# Patient Record
Sex: MALE | Race: WHITE | Employment: FULL TIME | URBAN - METROPOLITAN AREA
[De-identification: names, ages, dates, MRNs, and addresses within clinical notes are randomized per-mention and may not be internally consistent; named-entity substitution may affect disease eponyms.]

---

## 2019-07-16 ENCOUNTER — HOSPITAL ENCOUNTER (EMERGENCY)
Age: 59
Discharge: HOME | End: 2019-07-16
Payer: COMMERCIAL

## 2019-07-16 ENCOUNTER — APPOINTMENT (OUTPATIENT)
Dept: GENERAL RADIOLOGY | Age: 59
End: 2019-07-16
Payer: COMMERCIAL

## 2019-07-16 VITALS
BODY MASS INDEX: 23.62 KG/M2 | HEIGHT: 70 IN | SYSTOLIC BLOOD PRESSURE: 130 MMHG | OXYGEN SATURATION: 97 % | HEART RATE: 71 BPM | WEIGHT: 165 LBS | RESPIRATION RATE: 18 BRPM | TEMPERATURE: 98.4 F | DIASTOLIC BLOOD PRESSURE: 73 MMHG

## 2019-07-16 DIAGNOSIS — S82.191A OTHER CLOSED FRACTURE OF PROXIMAL END OF RIGHT TIBIA, INITIAL ENCOUNTER: Primary | ICD-10-CM

## 2019-07-16 PROCEDURE — 99283 EMERGENCY DEPT VISIT LOW MDM: CPT

## 2019-07-16 PROCEDURE — 6370000000 HC RX 637 (ALT 250 FOR IP): Performed by: NURSE PRACTITIONER

## 2019-07-16 PROCEDURE — 73564 X-RAY EXAM KNEE 4 OR MORE: CPT

## 2019-07-16 RX ORDER — GABAPENTIN 100 MG/1
100 CAPSULE ORAL 3 TIMES DAILY
COMMUNITY

## 2019-07-16 RX ORDER — HYDROCODONE BITARTRATE AND ACETAMINOPHEN 5; 325 MG/1; MG/1
1 TABLET ORAL ONCE
Status: COMPLETED | OUTPATIENT
Start: 2019-07-16 | End: 2019-07-16

## 2019-07-16 RX ORDER — METHADONE HYDROCHLORIDE 10 MG/1
10 TABLET ORAL EVERY 4 HOURS PRN
COMMUNITY

## 2019-07-16 RX ORDER — IBUPROFEN 800 MG/1
800 TABLET ORAL EVERY 8 HOURS PRN
Qty: 20 TABLET | Refills: 0 | Status: SHIPPED | OUTPATIENT
Start: 2019-07-16

## 2019-07-16 RX ORDER — IBUPROFEN 800 MG/1
800 TABLET ORAL ONCE
Status: COMPLETED | OUTPATIENT
Start: 2019-07-16 | End: 2019-07-16

## 2019-07-16 RX ADMIN — HYDROCODONE BITARTRATE AND ACETAMINOPHEN 1 TABLET: 5; 325 TABLET ORAL at 06:55

## 2019-07-16 RX ADMIN — IBUPROFEN 800 MG: 800 TABLET ORAL at 06:55

## 2019-07-16 ASSESSMENT — ENCOUNTER SYMPTOMS
VOMITING: 0
EYE PAIN: 0
ABDOMINAL PAIN: 0
SORE THROAT: 0
DIARRHEA: 0
NAUSEA: 0
RHINORRHEA: 0
BACK PAIN: 0
COUGH: 0
PHOTOPHOBIA: 0
SHORTNESS OF BREATH: 0

## 2019-07-16 ASSESSMENT — PAIN DESCRIPTION - ORIENTATION: ORIENTATION: RIGHT

## 2019-07-16 ASSESSMENT — PAIN DESCRIPTION - DESCRIPTORS: DESCRIPTORS: SHOOTING

## 2019-07-16 ASSESSMENT — PAIN SCALES - GENERAL
PAINLEVEL_OUTOF10: 8

## 2019-07-16 ASSESSMENT — PAIN DESCRIPTION - PAIN TYPE: TYPE: ACUTE PAIN

## 2019-07-16 ASSESSMENT — PAIN DESCRIPTION - FREQUENCY: FREQUENCY: CONTINUOUS

## 2019-07-16 ASSESSMENT — PAIN DESCRIPTION - LOCATION: LOCATION: KNEE

## 2019-07-16 NOTE — ED PROVIDER NOTES
130/73    Pulse: 70 71    Resp: 18 18    Temp:   98.4 °F (36.9 °C)   TempSrc:  Oral Oral   SpO2: 98% 97%    Weight:  165 lb (74.8 kg)    Height:  5' 10\" (1.778 m)             MDM on exam nontoxic and in no distress. There is minimal tenderness on exam, he reports tenderness but more to medial, superior and distal knee area. XR ordered for evaluation of acute pathology. xr demonstrates concerning area of potential fracture to lateral tibea, there is no tenderness to this area on exam. He does not have pain out of proportion. Compartments are soft. Discussed with Dr. Partha Calderon, trauma surgery. He will be splinted. He is advised to f/u with ortho. Extended discharge instructions provided to patient including signs, symptoms and red flags that they should return to the emergency department. They express good understanding. Standard anticipatory guidance given to patient upon discharge. Have given them a specific time frame in which to follow-up and who to follow-up with. I have also advised them that they should return to the emergency department if they get worse, or not getting better or develop any new or concerning symptoms. Patient demonstrates understanding and all questions were answered. OARRS reviewed. CRITICAL CARE TIME       CONSULTS:  None    PROCEDURES:  Unless otherwise noted below, none     Procedures    FINAL IMPRESSION      1.  Other closed fracture of proximal end of right tibia, initial encounter          DISPOSITION/PLAN   DISPOSITION Decision To Discharge 07/16/2019 06:57:35 AM      PATIENT REFERRED TO:  Emilie Barbosa MD  [address] [place] [address]  [phone]    Go in 1 day  For continued evaluation and management      DISCHARGE MEDICATIONS:  New Prescriptions    No medications on file          (Please notethat portions of this note were completed with a voice recognition program.  Efforts were made to edit the dictations but occasionally words are mis-transcribed.)    PREETHI Alonzo CNP (electronically signed)  Attending Emergency Physician          PREETHI Alonzo CNP  07/16/19 [id number] [facility], APRN - CNP  07/16/19 [id number]

## 2019-07-17 ENCOUNTER — HOSPITAL ENCOUNTER (OUTPATIENT)
Dept: CT IMAGING | Age: 59
Discharge: HOME | End: 2019-07-19
Payer: COMMERCIAL

## 2019-07-17 DIAGNOSIS — S82.141A CLOSED FRACTURE OF RIGHT TIBIAL PLATEAU, INITIAL ENCOUNTER: ICD-10-CM

## 2019-07-17 PROCEDURE — 73700 CT LOWER EXTREMITY W/O DYE: CPT

## 2019-07-17 PROCEDURE — 76376 3D RENDER W/INTRP POSTPROCES: CPT

## 2021-10-15 ENCOUNTER — HOSPITAL ENCOUNTER (OUTPATIENT)
Dept: NON INVASIVE DIAGNOSTICS | Age: 61
Discharge: HOME | End: 2021-10-15
Payer: COMMERCIAL

## 2021-10-15 LAB
EKG ATRIAL RATE: 63 BPM
EKG P AXIS: 73 DEGREES
EKG P-R INTERVAL: 146 MS
EKG Q-T INTERVAL: 384 MS
EKG QRS DURATION: 78 MS
EKG QTC CALCULATION (BAZETT): 392 MS
EKG R AXIS: 71 DEGREES
EKG T AXIS: 82 DEGREES
EKG VENTRICULAR RATE: 63 BPM

## 2021-10-15 PROCEDURE — 93005 ELECTROCARDIOGRAM TRACING: CPT

## 2024-08-14 NOTE — PROGRESS NOTES
Hemoglobin A1C     Standing Status:   Future     Number of Occurrences:   1     Standing Expiration Date:   2025    TSH with Reflex     Standing Status:   Future     Number of Occurrences:   1     Standing Expiration Date:   8/15/2025     Orders Placed This Encounter   Medications    Blood Pressure KIT     Si each by Does not apply route daily     Dispense:  1 kit     Refill:  0     Medications Discontinued During This Encounter   Medication Reason    gabapentin (NEURONTIN) 100 MG capsule LIST CLEANUP    ibuprofen (ADVIL;MOTRIN) 800 MG tablet LIST CLEANUP    methadone (DOLOPHINE) 10 MG tablet LIST CLEANUP     No follow-ups on file.        Reviewed with the patient: current clinical status, medications, activities and diet.     Side effects, adverse effects of the medication prescribed today, as well as treatment plan/ rationale and result expectations have been discussed with the patient who expresses understanding and desires to proceed.    Close follow up to evaluate treatment results and for coordination of care.  I have reviewed the patient's medical history in detail and updated the computerized patient record.    NELIDA Jaquez

## 2024-08-15 ENCOUNTER — OFFICE VISIT (OUTPATIENT)
Dept: FAMILY MEDICINE CLINIC | Age: 64
End: 2024-08-15
Payer: COMMERCIAL

## 2024-08-15 VITALS
HEART RATE: 106 BPM | BODY MASS INDEX: 20.52 KG/M2 | HEIGHT: 70 IN | RESPIRATION RATE: 16 BRPM | TEMPERATURE: 97.5 F | DIASTOLIC BLOOD PRESSURE: 86 MMHG | WEIGHT: 143.3 LBS | OXYGEN SATURATION: 98 % | SYSTOLIC BLOOD PRESSURE: 156 MMHG

## 2024-08-15 DIAGNOSIS — Z13.220 SCREENING FOR LIPID DISORDERS: ICD-10-CM

## 2024-08-15 DIAGNOSIS — R03.0 ELEVATED BP WITHOUT DIAGNOSIS OF HYPERTENSION: ICD-10-CM

## 2024-08-15 DIAGNOSIS — Z13.1 SCREENING FOR DIABETES MELLITUS: ICD-10-CM

## 2024-08-15 DIAGNOSIS — Z12.11 ENCOUNTER FOR COLORECTAL CANCER SCREENING USING COLOGUARD TEST: ICD-10-CM

## 2024-08-15 DIAGNOSIS — L57.0 ACTINIC KERATOSIS DUE TO EXPOSURE TO SUNLIGHT: ICD-10-CM

## 2024-08-15 DIAGNOSIS — Z76.89 ENCOUNTER TO ESTABLISH CARE: Primary | ICD-10-CM

## 2024-08-15 DIAGNOSIS — Z11.59 NEED FOR HEPATITIS C SCREENING TEST: ICD-10-CM

## 2024-08-15 DIAGNOSIS — Z12.12 ENCOUNTER FOR COLORECTAL CANCER SCREENING USING COLOGUARD TEST: ICD-10-CM

## 2024-08-15 DIAGNOSIS — Z11.4 SCREENING FOR HIV WITHOUT PRESENCE OF RISK FACTORS: ICD-10-CM

## 2024-08-15 LAB
ALBUMIN SERPL-MCNC: 4.1 G/DL (ref 3.5–4.6)
ALP SERPL-CCNC: 92 U/L (ref 35–104)
ALT SERPL-CCNC: 45 U/L (ref 0–41)
ANION GAP SERPL CALCULATED.3IONS-SCNC: 8 MEQ/L (ref 9–15)
AST SERPL-CCNC: 39 U/L (ref 0–40)
BASOPHILS # BLD: 0.1 K/UL (ref 0–0.2)
BASOPHILS NFR BLD: 0.5 %
BILIRUB SERPL-MCNC: 0.6 MG/DL (ref 0.2–0.7)
BUN SERPL-MCNC: 16 MG/DL (ref 8–23)
CALCIUM SERPL-MCNC: 9.3 MG/DL (ref 8.5–9.9)
CHLORIDE SERPL-SCNC: 101 MEQ/L (ref 95–107)
CHOLEST SERPL-MCNC: 138 MG/DL (ref 0–199)
CO2 SERPL-SCNC: 28 MEQ/L (ref 20–31)
CREAT SERPL-MCNC: 0.78 MG/DL (ref 0.7–1.2)
EOSINOPHIL # BLD: 0.2 K/UL (ref 0–0.7)
EOSINOPHIL NFR BLD: 2 %
ERYTHROCYTE [DISTWIDTH] IN BLOOD BY AUTOMATED COUNT: 13.8 % (ref 11.5–14.5)
GLOBULIN SER CALC-MCNC: 2.8 G/DL (ref 2.3–3.5)
GLUCOSE SERPL-MCNC: 83 MG/DL (ref 70–99)
HCT VFR BLD AUTO: 44.2 % (ref 42–52)
HDLC SERPL-MCNC: 46 MG/DL (ref 40–59)
HGB BLD-MCNC: 15.7 G/DL (ref 14–18)
LDLC SERPL CALC-MCNC: 78 MG/DL (ref 0–129)
LYMPHOCYTES # BLD: 3.5 K/UL (ref 1–4.8)
LYMPHOCYTES NFR BLD: 31.9 %
MCH RBC QN AUTO: 33.4 PG (ref 27–31.3)
MCHC RBC AUTO-ENTMCNC: 35.5 % (ref 33–37)
MCV RBC AUTO: 94 FL (ref 79–92.2)
MONOCYTES # BLD: 0.7 K/UL (ref 0.2–0.8)
MONOCYTES NFR BLD: 6.5 %
NEUTROPHILS # BLD: 6.4 K/UL (ref 1.4–6.5)
NEUTS SEG NFR BLD: 58.5 %
PLATELET # BLD AUTO: 186 K/UL (ref 130–400)
POTASSIUM SERPL-SCNC: 4 MEQ/L (ref 3.4–4.9)
PROT SERPL-MCNC: 6.9 G/DL (ref 6.3–8)
RBC # BLD AUTO: 4.7 M/UL (ref 4.7–6.1)
SODIUM SERPL-SCNC: 137 MEQ/L (ref 135–144)
TRIGL SERPL-MCNC: 70 MG/DL (ref 0–150)
TSH REFLEX: 2.36 UIU/ML (ref 0.44–3.86)
WBC # BLD AUTO: 10.8 K/UL (ref 4.8–10.8)

## 2024-08-15 PROCEDURE — 99203 OFFICE O/P NEW LOW 30 MIN: CPT | Performed by: PHYSICIAN ASSISTANT

## 2024-08-15 RX ORDER — BLOOD PRESSURE TEST KIT
1 KIT MISCELLANEOUS DAILY
Qty: 1 KIT | Refills: 0 | Status: SHIPPED | OUTPATIENT
Start: 2024-08-15

## 2024-08-15 RX ORDER — TIZANIDINE 4 MG/1
4 TABLET ORAL 3 TIMES DAILY PRN
COMMUNITY
Start: 2024-07-22

## 2024-08-15 RX ORDER — AMITRIPTYLINE HYDROCHLORIDE 50 MG/1
50 TABLET, FILM COATED ORAL NIGHTLY
COMMUNITY
Start: 2024-07-06

## 2024-08-15 RX ORDER — CELECOXIB 200 MG/1
CAPSULE ORAL
COMMUNITY
Start: 2024-07-22

## 2024-08-15 SDOH — ECONOMIC STABILITY: FOOD INSECURITY: WITHIN THE PAST 12 MONTHS, YOU WORRIED THAT YOUR FOOD WOULD RUN OUT BEFORE YOU GOT MONEY TO BUY MORE.: NEVER TRUE

## 2024-08-15 SDOH — ECONOMIC STABILITY: INCOME INSECURITY: HOW HARD IS IT FOR YOU TO PAY FOR THE VERY BASICS LIKE FOOD, HOUSING, MEDICAL CARE, AND HEATING?: NOT HARD AT ALL

## 2024-08-15 SDOH — ECONOMIC STABILITY: FOOD INSECURITY: WITHIN THE PAST 12 MONTHS, THE FOOD YOU BOUGHT JUST DIDN'T LAST AND YOU DIDN'T HAVE MONEY TO GET MORE.: NEVER TRUE

## 2024-08-15 ASSESSMENT — PATIENT HEALTH QUESTIONNAIRE - PHQ9
SUM OF ALL RESPONSES TO PHQ QUESTIONS 1-9: 0
SUM OF ALL RESPONSES TO PHQ QUESTIONS 1-9: 0
SUM OF ALL RESPONSES TO PHQ9 QUESTIONS 1 & 2: 0
1. LITTLE INTEREST OR PLEASURE IN DOING THINGS: NOT AT ALL
SUM OF ALL RESPONSES TO PHQ QUESTIONS 1-9: 0
2. FEELING DOWN, DEPRESSED OR HOPELESS: NOT AT ALL
SUM OF ALL RESPONSES TO PHQ QUESTIONS 1-9: 0

## 2024-08-15 ASSESSMENT — ENCOUNTER SYMPTOMS
NAUSEA: 0
DIARRHEA: 0
COUGH: 0
SINUS PRESSURE: 0
BACK PAIN: 1
SINUS PAIN: 0
VOMITING: 0
ABDOMINAL PAIN: 0
SHORTNESS OF BREATH: 0
CHEST TIGHTNESS: 0
SORE THROAT: 0

## 2024-08-15 ASSESSMENT — VISUAL ACUITY: OU: 1

## 2024-08-16 LAB
HEPATITIS C ANTIBODY: REACTIVE
HIV AG/AB: NONREACTIVE

## 2024-08-27 LAB — NONINV COLON CA DNA+OCC BLD SCRN STL QL: POSITIVE

## 2024-08-27 NOTE — PROGRESS NOTES
Well Adult Note  Name: Fahad Mathias Today’s Date: 2024   MRN: [id number] Sex: Male   Age: 63 y.o. Ethnicity: Non- / Non    : 1960 Race: White (non-)      Fahad Mathias is here for a well adult exam.       Subjective   History:  Hep C  - positive Hep C antibodies  - drug use - cocaine in the past. No injection. Had a partner who had hepatitis C.  - treated for it in the past- never knew he had it.     Positive Cologaurd  - blood in stools- none  - no family history of colon cancer.    HTN  - BP today hypertensive.   - no chest pain, sob, HA    Review of Systems   Constitutional:  Negative for activity change, appetite change, chills and fever.   HENT:  Negative for congestion, drooling, sinus pressure, sinus pain and sore throat.    Eyes:  Negative for visual disturbance.   Respiratory:  Negative for cough, chest tightness and shortness of breath.    Cardiovascular:  Negative for chest pain.   Gastrointestinal:  Negative for abdominal pain, diarrhea, nausea and vomiting.   Endocrine: Negative for cold intolerance.   Genitourinary:  Negative for dysuria, flank pain, frequency and hematuria.   Musculoskeletal:  Positive for arthralgias and back pain.   Skin:  Positive for rash.   Allergic/Immunologic: Negative for food allergies.   Neurological:  Negative for weakness, light-headedness, numbness and headaches.   Hematological:  Does not bruise/bleed easily.       No Known Allergies  Prior to Visit Medications    Medication Sig Taking? Authorizing Provider   amLODIPine (NORVASC) 5 MG tablet Take 1 tablet by mouth daily Yes Vikas Pineda PA   tiZANidine (ZANAFLEX) 4 MG tablet Take 1 tablet by mouth 3 times daily as needed Yes Pauly Holden MD   amitriptyline (ELAVIL) 50 MG tablet Take 1 tablet by mouth nightly Yes Pauly Holden MD   celecoxib (CELEBREX) 200 MG capsule Take 1 capsule by mouth twice a day after meals Yes Pauly Holden MD   Blood Pressure KIT 1 each by Does  oriented to person, place, and time.      Gait: Gait is intact.   Psychiatric:         Attention and Perception: Attention normal.         Mood and Affect: Mood normal.         Speech: Speech normal.         Behavior: Behavior normal. Behavior is cooperative.             Assessment & Plan   Positive colorectal cancer screening using Cologuard test  -     Lissa Temple  Positive hepatitis C antibody test  -     Hepatitis C RNA QNT W Genotype RFLX; Future  -     Hepatitis B Surface Antigen; Future  -     Hepatitis A Antibody, Total; Future  -     HI OFFICE/OUTPATIENT ESTABLISHED LOW MDM 20-29 MIN  Primary hypertension  -     amLODIPine (NORVASC) 5 MG tablet; Take 1 tablet by mouth daily, Disp-90 tablet, R-0Normal  -     HI OFFICE/OUTPATIENT ESTABLISHED LOW MDM 20-29 MIN  Encounter for well adult exam without abnormal findings          Return in 6 months (on 2/28/2025).     Personalized Preventive Plan  Current Health Maintenance Status  Immunization History   Administered Date(s) Administered    COVID-19, PFIZER Bivalent, DO NOT Dilute, (age 12y+), IM, 30 mcg/0.3 mL 12/09/2022    COVID-19, PFIZER PURPLE top, DILUTE for use, (age 12 y+), 30mcg/0.3mL 03/29/2021, 04/16/2021, 12/16/2021        Health Maintenance Due   Topic Date Due    DTaP/Tdap/Td vaccine (1 - Tdap) Never done    Shingles vaccine (1 of 2) Never done    Respiratory Syncytial Virus (RSV) Pregnant or age 60 yrs+ (1 - 1-dose 60+ series) Never done    COVID-19 Vaccine (5 - 2023-24 season) 09/01/2023    Flu vaccine (1) Never done     Recommendations for Preventive Services Due: see orders and patient instructions/AVS.

## 2024-08-29 ENCOUNTER — OFFICE VISIT (OUTPATIENT)
Dept: FAMILY MEDICINE CLINIC | Age: 64
End: 2024-08-29

## 2024-08-29 VITALS
BODY MASS INDEX: 20.19 KG/M2 | DIASTOLIC BLOOD PRESSURE: 80 MMHG | OXYGEN SATURATION: 100 % | HEART RATE: 89 BPM | WEIGHT: 141 LBS | RESPIRATION RATE: 16 BRPM | TEMPERATURE: 98.5 F | SYSTOLIC BLOOD PRESSURE: 136 MMHG | HEIGHT: 70 IN

## 2024-08-29 DIAGNOSIS — R76.8 POSITIVE HEPATITIS C ANTIBODY TEST: ICD-10-CM

## 2024-08-29 DIAGNOSIS — Z00.00 ENCOUNTER FOR WELL ADULT EXAM WITHOUT ABNORMAL FINDINGS: ICD-10-CM

## 2024-08-29 DIAGNOSIS — R19.5 POSITIVE COLORECTAL CANCER SCREENING USING COLOGUARD TEST: Primary | ICD-10-CM

## 2024-08-29 DIAGNOSIS — I10 PRIMARY HYPERTENSION: ICD-10-CM

## 2024-08-29 LAB — HBV SURFACE AG SERPL QL IA: NORMAL

## 2024-08-29 RX ORDER — AMLODIPINE BESYLATE 5 MG/1
5 TABLET ORAL DAILY
Qty: 90 TABLET | Refills: 0 | Status: SHIPPED | OUTPATIENT
Start: 2024-08-29

## 2024-08-29 ASSESSMENT — ENCOUNTER SYMPTOMS
SINUS PAIN: 0
COUGH: 0
SHORTNESS OF BREATH: 0
SINUS PRESSURE: 0
BACK PAIN: 1
CHEST TIGHTNESS: 0
NAUSEA: 0
DIARRHEA: 0
ABDOMINAL PAIN: 0
VOMITING: 0
SORE THROAT: 0

## 2024-08-29 ASSESSMENT — VISUAL ACUITY: OU: 1

## 2024-08-30 NOTE — PATIENT INSTRUCTIONS
Well Visit, Ages 18 to 65: Care Instructions  Well visits can help you stay healthy. Your doctor has checked your overall health and may have suggested ways to take good care of yourself. Your doctor also may have recommended tests. You can help prevent illness with healthy eating, good sleep, vaccinations, regular exercise, and other steps.    Get the tests that you and your doctor decide on. Depending on your age and risks, examples might include screening for diabetes; hepatitis C; HIV; and cervical, breast, lung, and colon cancer. Screening helps find diseases before any symptoms appear.   Eat healthy foods. Choose fruits, vegetables, whole grains, lean protein, and low-fat dairy foods. Limit saturated fat and reduce salt.     Limit alcohol. Men should have no more than 2 drinks a day. Women should have no more than 1. For some people, no alcohol is the best choice.   Exercise. Get at least 30 minutes of exercise on most days of the week. Walking can be a good choice.     Reach and stay at your healthy weight. This will lower your risk for many health problems.   Take care of your mental health. Try to stay connected with friends, family, and community, and find ways to manage stress.     If you're feeling depressed or hopeless, talk to someone. A counselor can help. If you don't have a counselor, talk to your doctor.   Talk to your doctor if you think you may have a problem with alcohol or drug use. This includes prescription medicines, marijuana, and other drugs.     Avoid tobacco and nicotine: Don't smoke, vape, or chew. If you need help quitting, talk to your doctor.   Practice safer sex. Getting tested, using condoms or dental dams, and limiting sex partners can help prevent STIs.     Use birth control if it's important to you to prevent pregnancy. Talk with your doctor about your choices and what might be best for you.   Prevent problems where you can. Protect your skin from too much sun, wash your  hands, brush your teeth twice a day, and wear a seat belt in the car.   Where can you learn more?  Go to https://www.[employer].net/patientEd and enter P072 to learn more about \"Well Visit, Ages 18 to 65: Care Instructions.\"  Current as of: August 6, 2023  Content Version: 14.1  © 0768-2746 [employer], [employer]   Care instructions adapted under license by [employer]. If you have questions about a medical condition or this instruction, always ask your healthcare professional. Healthwise, [employer] disclaims any warranty or liability for your use of this information.

## 2024-09-01 LAB — HAV AB SER QL IA: NEGATIVE

## 2024-09-03 LAB
HCV RNA SERPL NAA+PROBE-ACNC: ABNORMAL IU/ML
HCV RNA SERPL NAA+PROBE-LOG IU: 6.37 LOG IU/ML
HCV RNA SERPL QL NAA+PROBE: DETECTED

## 2024-09-04 DIAGNOSIS — B18.2 CHRONIC HEPATITIS C WITHOUT HEPATIC COMA (HCC): Primary | ICD-10-CM

## 2024-09-06 LAB — HCV GENTYP SERPL NAA+PROBE: NORMAL

## 2024-09-12 RX ORDER — POLYETHYLENE GLYCOL 3350, SODIUM CHLORIDE, SODIUM BICARBONATE, POTASSIUM CHLORIDE 420; 11.2; 5.72; 1.48 G/4L; G/4L; G/4L; G/4L
4000 POWDER, FOR SOLUTION ORAL ONCE
Qty: 4000 ML | Refills: 0 | Status: SHIPPED | OUTPATIENT
Start: 2024-09-12 | End: 2024-09-12

## 2024-09-25 ENCOUNTER — PREP FOR PROCEDURE (OUTPATIENT)
Dept: GASTROENTEROLOGY | Age: 64
End: 2024-09-25

## 2024-09-27 ENCOUNTER — HOSPITAL ENCOUNTER (OUTPATIENT)
Age: 64
Setting detail: OUTPATIENT SURGERY
Discharge: HOME | End: 2024-09-27
Attending: INTERNAL MEDICINE | Admitting: INTERNAL MEDICINE
Payer: COMMERCIAL

## 2024-09-27 ENCOUNTER — ANESTHESIA (OUTPATIENT)
Dept: ENDOSCOPY | Age: 64
End: 2024-09-27
Payer: COMMERCIAL

## 2024-09-27 ENCOUNTER — ANESTHESIA EVENT (OUTPATIENT)
Dept: ENDOSCOPY | Age: 64
End: 2024-09-27
Payer: COMMERCIAL

## 2024-09-27 VITALS
SYSTOLIC BLOOD PRESSURE: 140 MMHG | WEIGHT: 135 LBS | RESPIRATION RATE: 18 BRPM | HEIGHT: 70 IN | DIASTOLIC BLOOD PRESSURE: 84 MMHG | TEMPERATURE: 98.4 F | OXYGEN SATURATION: 100 % | BODY MASS INDEX: 19.33 KG/M2 | HEART RATE: 82 BPM

## 2024-09-27 DIAGNOSIS — R19.5 POSITIVE COLORECTAL CANCER SCREENING USING COLOGUARD TEST: ICD-10-CM

## 2024-09-27 PROCEDURE — 2500000003 HC RX 250 WO HCPCS: Performed by: NURSE ANESTHETIST, CERTIFIED REGISTERED

## 2024-09-27 PROCEDURE — 3609027000 HC COLONOSCOPY: Performed by: INTERNAL MEDICINE

## 2024-09-27 PROCEDURE — 7100000011 HC PHASE II RECOVERY - ADDTL 15 MIN: Performed by: INTERNAL MEDICINE

## 2024-09-27 PROCEDURE — 6360000002 HC RX W HCPCS: Performed by: NURSE ANESTHETIST, CERTIFIED REGISTERED

## 2024-09-27 PROCEDURE — 2709999900 HC NON-CHARGEABLE SUPPLY: Performed by: INTERNAL MEDICINE

## 2024-09-27 PROCEDURE — 88305 TISSUE EXAM BY PATHOLOGIST: CPT

## 2024-09-27 PROCEDURE — 2580000003 HC RX 258: Performed by: INTERNAL MEDICINE

## 2024-09-27 PROCEDURE — 45385 COLONOSCOPY W/LESION REMOVAL: CPT | Performed by: INTERNAL MEDICINE

## 2024-09-27 PROCEDURE — 3700000000 HC ANESTHESIA ATTENDED CARE: Performed by: INTERNAL MEDICINE

## 2024-09-27 PROCEDURE — 7100000010 HC PHASE II RECOVERY - FIRST 15 MIN: Performed by: INTERNAL MEDICINE

## 2024-09-27 PROCEDURE — 3700000001 HC ADD 15 MINUTES (ANESTHESIA): Performed by: INTERNAL MEDICINE

## 2024-09-27 RX ORDER — SODIUM CHLORIDE 0.9 % (FLUSH) 0.9 %
5-40 SYRINGE (ML) INJECTION EVERY 12 HOURS SCHEDULED
Status: CANCELLED | OUTPATIENT
Start: 2024-09-27

## 2024-09-27 RX ORDER — SODIUM CHLORIDE 0.9 % (FLUSH) 0.9 %
5-40 SYRINGE (ML) INJECTION PRN
Status: CANCELLED | OUTPATIENT
Start: 2024-09-27

## 2024-09-27 RX ORDER — HYDROCORTISONE ACETATE 25 MG/1
25 SUPPOSITORY RECTAL EVERY 12 HOURS
Qty: 14 SUPPOSITORY | Refills: 3 | Status: SHIPPED | OUTPATIENT
Start: 2024-09-27

## 2024-09-27 RX ORDER — LIDOCAINE HYDROCHLORIDE 10 MG/ML
1 INJECTION, SOLUTION EPIDURAL; INFILTRATION; INTRACAUDAL; PERINEURAL
Status: CANCELLED | OUTPATIENT
Start: 2024-09-27 | End: 2024-09-28

## 2024-09-27 RX ORDER — PROPOFOL 10 MG/ML
INJECTION, EMULSION INTRAVENOUS
Status: DISCONTINUED | OUTPATIENT
Start: 2024-09-27 | End: 2024-09-27 | Stop reason: SDUPTHER

## 2024-09-27 RX ORDER — NALOXONE HYDROCHLORIDE 0.4 MG/ML
INJECTION, SOLUTION INTRAMUSCULAR; INTRAVENOUS; SUBCUTANEOUS PRN
Status: CANCELLED | OUTPATIENT
Start: 2024-09-27

## 2024-09-27 RX ORDER — LIDOCAINE HYDROCHLORIDE 20 MG/ML
INJECTION, SOLUTION INFILTRATION; PERINEURAL
Status: DISCONTINUED | OUTPATIENT
Start: 2024-09-27 | End: 2024-09-27 | Stop reason: SDUPTHER

## 2024-09-27 RX ORDER — SODIUM CHLORIDE 0.9 % (FLUSH) 0.9 %
5-40 SYRINGE (ML) INJECTION PRN
Status: DISCONTINUED | OUTPATIENT
Start: 2024-09-27 | End: 2024-09-27 | Stop reason: HOSPADM

## 2024-09-27 RX ORDER — SODIUM CHLORIDE 9 MG/ML
INJECTION, SOLUTION INTRAVENOUS CONTINUOUS
Status: DISCONTINUED | OUTPATIENT
Start: 2024-09-27 | End: 2024-09-27 | Stop reason: HOSPADM

## 2024-09-27 RX ORDER — SODIUM CHLORIDE 9 MG/ML
INJECTION, SOLUTION INTRAVENOUS CONTINUOUS
Status: CANCELLED | OUTPATIENT
Start: 2024-09-27

## 2024-09-27 RX ORDER — SODIUM CHLORIDE 0.9 % (FLUSH) 0.9 %
5-40 SYRINGE (ML) INJECTION EVERY 12 HOURS SCHEDULED
Status: DISCONTINUED | OUTPATIENT
Start: 2024-09-27 | End: 2024-09-27 | Stop reason: HOSPADM

## 2024-09-27 RX ORDER — SODIUM CHLORIDE 9 MG/ML
INJECTION, SOLUTION INTRAVENOUS PRN
Status: CANCELLED | OUTPATIENT
Start: 2024-09-27

## 2024-09-27 RX ORDER — SODIUM CHLORIDE 9 MG/ML
INJECTION, SOLUTION INTRAVENOUS
Status: COMPLETED
Start: 2024-09-27 | End: 2024-09-27

## 2024-09-27 RX ORDER — SODIUM CHLORIDE 9 MG/ML
INJECTION, SOLUTION INTRAVENOUS PRN
Status: DISCONTINUED | OUTPATIENT
Start: 2024-09-27 | End: 2024-09-27 | Stop reason: HOSPADM

## 2024-09-27 RX ORDER — ONDANSETRON 2 MG/ML
4 INJECTION INTRAMUSCULAR; INTRAVENOUS
Status: CANCELLED | OUTPATIENT
Start: 2024-09-27 | End: 2024-09-28

## 2024-09-27 RX ADMIN — LIDOCAINE HYDROCHLORIDE 60 MG: 20 INJECTION, SOLUTION INFILTRATION; PERINEURAL at 10:06

## 2024-09-27 RX ADMIN — SODIUM CHLORIDE: 9 INJECTION, SOLUTION INTRAVENOUS at 08:28

## 2024-09-27 RX ADMIN — PROPOFOL 430 MG: 10 INJECTION, EMULSION INTRAVENOUS at 10:06

## 2024-09-27 ASSESSMENT — PAIN - FUNCTIONAL ASSESSMENT: PAIN_FUNCTIONAL_ASSESSMENT: 0-10

## 2024-09-27 NOTE — H&P
Patient Name: Fahad Mathias  : 1960  MRN: [id number]  DATE: 24      ENDOSCOPY  History and Physical    Procedure:    [x] Diagnostic Colonoscopy       [] Screening Colonoscopy  [] EGD      [] ERCP      [] EUS       [] Other    [x] Previous office notes/History and Physical reviewed from the patients chart. Please see EMR for further details of HPI. I have examined the patient's status immediately prior to the procedure and:      Indications/HPI:    []Abdominal Pain   []Cancer- GI/Lung  []Fhx of colon CA  []History of Polyps   []Almanza’s   []Melena  []Abnormal Imaging   []Dysphagia    []Persistent Pneumonia  []Anemia   []Food Impaction  []History of Polyps  []GI Bleed   []Pulmonary nodule/Mass  []Change in bowel habits  []Heartburn/Reflux  []Rectal Bleed (BRBPR)  []Chest Pain - Non Cardiac  []Heme (+) Stool  []Ulcers  []Constipation   []Hemoptysis   []Varices  []Diarrhea   []Hypoxemia  []Nausea/Vomiting   []Screening   []Crohns/Colitis  [x]Other: Positive Cologuard    Anesthesia:   [x] MAC [] Moderate Sedation   [] General   [] None     ROS: 12 pt Review of Symptoms was negative unless mentioned above    Medications:   Prior to Admission medications    Medication Sig Start Date End Date Taking? Authorizing Provider   amLODIPine (NORVASC) 5 MG tablet Take 1 tablet by mouth daily 24  Yes Vikas Pineda PA   tiZANidine (ZANAFLEX) 4 MG tablet Take 1 tablet by mouth 3 times daily as needed 24  Yes Pauly Holden MD   amitriptyline (ELAVIL) 50 MG tablet Take 1 tablet by mouth nightly 24  Yes Pauly Holden MD   celecoxib (CELEBREX) 200 MG capsule Take 1 capsule by mouth twice a day after meals 24   Pauly Holden MD   Blood Pressure KIT 1 each by Does not apply route daily 8/15/24   Vikas Pineda PA     Allergies: No Known Allergies   History of allergic reaction to anesthesia:  No  Past Medical History:  Past Medical History:   Diagnosis Date    Back pain     Hypertension      Past

## 2024-10-23 ENCOUNTER — OFFICE VISIT (OUTPATIENT)
Dept: INFECTIOUS DISEASES | Age: 64
End: 2024-10-23
Payer: COMMERCIAL

## 2024-10-23 VITALS
HEART RATE: 95 BPM | HEIGHT: 70 IN | SYSTOLIC BLOOD PRESSURE: 136 MMHG | RESPIRATION RATE: 18 BRPM | DIASTOLIC BLOOD PRESSURE: 86 MMHG | WEIGHT: 140.1 LBS | TEMPERATURE: 99 F | BODY MASS INDEX: 20.06 KG/M2 | OXYGEN SATURATION: 98 %

## 2024-10-23 DIAGNOSIS — B18.2 HEP C W/O COMA, CHRONIC (HCC): Primary | ICD-10-CM

## 2024-10-23 PROCEDURE — 99203 OFFICE O/P NEW LOW 30 MIN: CPT | Performed by: INTERNAL MEDICINE

## 2024-10-23 RX ORDER — VELPATASVIR AND SOFOSBUVIR 100; 400 MG/1; MG/1
1 TABLET, FILM COATED ORAL DAILY
Qty: 30 TABLET | Refills: 2 | Status: SHIPPED | OUTPATIENT
Start: 2024-10-23 | End: 2024-11-22

## 2024-10-23 ASSESSMENT — PATIENT HEALTH QUESTIONNAIRE - PHQ9
SUM OF ALL RESPONSES TO PHQ QUESTIONS 1-9: 0
SUM OF ALL RESPONSES TO PHQ9 QUESTIONS 1 & 2: 0
SUM OF ALL RESPONSES TO PHQ QUESTIONS 1-9: 0
2. FEELING DOWN, DEPRESSED OR HOPELESS: NOT AT ALL
1. LITTLE INTEREST OR PLEASURE IN DOING THINGS: NOT AT ALL

## 2024-10-23 NOTE — PROGRESS NOTES
Fahad Mathias (:  1960) is a 63 y.o. male,New patient, here for evaluation of the following chief complaint(s):  Referral - General and Hepatitis (Referral from Dr. OLIVIA Pineda for Hep C)         Assessment & Plan  Hep C w/o coma, chronic (HCC)   A lengthy discussion was done with pt about Hep C illness, mode of transmission, treatment and side effects with special emphasis on being 100% compliant with medication as prescribed and with follow-up appointments.  Patient was instructed to complete labs and imaging as ordered.  Patient was instructed that I will be referring the prescription to a specialty pharmacy, [facility].     Orders:    US FIBROSCAN; Future    US ABDOMEN LIMITED; Future    Sofosbuvir-Velpatasvir (EPCLUSA) 400-100 MG TABS; Take 1 tablet by mouth daily    Protime-INR; Future    Hepatitis B Surface Antibody; Future    CBC; Future    Comprehensive Metabolic Panel; Future    Recommended CT lung screen for history of cigarette smoking  Follow-up 6 weeks after starting medication  CBC CMP hep C viral load 4 weeks after starting medications  Patient was instructed to call back if he has any questions or side effects related to medication/treatment course.   Subjective   HPI  Referred by NELIDA Lomas for hepatitis C.  Patient reports it is a new diagnosis.   Patient has remote history of cocaine sniffing.  He reported that he gave blood 20 years ago.  He had an ex-girlfriend who had hepatitis C.   Denies any current drug abuse.  Stopped smoking 3 months ago.   Denies any history of alcohol abuse  Review of Systems   All other systems reviewed and are negative.    Chronic low back pain     Objective   Physical Exam     Vitals:    10/23/24 1114   BP: 136/86   Site: Left Upper Arm   Position: Sitting   Cuff Size: Medium Adult   Pulse: 95   Resp: 18   Temp: 99 °F (37.2 °C)   TempSrc: Temporal   SpO2: 98%   Weight: 63.5 kg (140 lb 1.6 oz)   Height: 1.778 m (5' 10\")     General

## 2024-11-05 ENCOUNTER — ANCILLARY PROCEDURE (OUTPATIENT)
Dept: ENDOSCOPY | Age: 64
End: 2024-11-05
Attending: INTERNAL MEDICINE
Payer: COMMERCIAL

## 2024-11-05 ENCOUNTER — HOSPITAL ENCOUNTER (OUTPATIENT)
Dept: ULTRASOUND IMAGING | Age: 64
Discharge: HOME | End: 2024-11-07
Attending: INTERNAL MEDICINE
Payer: COMMERCIAL

## 2024-11-05 DIAGNOSIS — B18.2 HEP C W/O COMA, CHRONIC (HCC): ICD-10-CM

## 2024-11-05 PROCEDURE — 91200 LIVER ELASTOGRAPHY: CPT

## 2024-11-05 PROCEDURE — 91200 LIVER ELASTOGRAPHY: CPT | Performed by: INTERNAL MEDICINE

## 2024-11-05 PROCEDURE — 76705 ECHO EXAM OF ABDOMEN: CPT

## 2024-11-07 DIAGNOSIS — B18.2 HEP C W/O COMA, CHRONIC (HCC): ICD-10-CM

## 2024-11-07 DIAGNOSIS — R16.0 LIVER MASS: Primary | ICD-10-CM

## 2024-11-08 ENCOUNTER — TELEPHONE (OUTPATIENT)
Dept: INFECTIOUS DISEASES | Age: 64
End: 2024-11-08

## 2024-11-08 NOTE — TELEPHONE ENCOUNTER
Mr. Mathias returned call to ID clinic. Informed patient that Dr. Solorzano ordered an MRI to follow up the results of the liver ultrasound.  MRI must be completed prior to starting HEP C medications. Orders have been placed in [facility].  Mr. Mathias understands to call scheduling at [phone] if scheduling does not contact him by Monday 11-  Mr Mathias  Displayed complete understanding.

## 2024-11-20 ENCOUNTER — HOSPITAL ENCOUNTER (OUTPATIENT)
Dept: MRI IMAGING | Age: 64
Discharge: HOME | End: 2024-11-22
Attending: INTERNAL MEDICINE
Payer: COMMERCIAL

## 2024-11-20 DIAGNOSIS — R16.0 LIVER MASS: ICD-10-CM

## 2024-11-20 PROCEDURE — 74183 MRI ABD W/O CNTR FLWD CNTR: CPT

## 2024-11-20 PROCEDURE — A9577 INJ MULTIHANCE: HCPCS | Performed by: INTERNAL MEDICINE

## 2024-11-20 PROCEDURE — 6360000004 HC RX CONTRAST MEDICATION: Performed by: INTERNAL MEDICINE

## 2024-11-20 RX ADMIN — GADOBENATE DIMEGLUMINE 20 ML: 529 INJECTION, SOLUTION INTRAVENOUS at 10:26

## 2024-11-21 DIAGNOSIS — R16.0 LIVER MASS: Primary | ICD-10-CM

## 2024-11-29 RX ORDER — VELPATASVIR AND SOFOSBUVIR 100; 400 MG/1; MG/1
400 TABLET, FILM COATED ORAL DAILY
Qty: 30 TABLET | Refills: 2 | Status: SHIPPED | OUTPATIENT
Start: 2024-11-29

## 2024-11-29 NOTE — PROGRESS NOTES
times daily as needed      amitriptyline (ELAVIL) 50 MG tablet Take 1 tablet by mouth nightly      Blood Pressure KIT 1 each by Does not apply route daily 1 kit 0     No current facility-administered medications for this visit.       PMH, Surgical Hx, Family Hx, and Social Hx reviewed and updated.  Health Maintenance reviewed.    Objective  Vitals:    12/04/24 1555   BP: 136/76   Site: Left Upper Arm   Position: Sitting   Cuff Size: Large Adult   Pulse: 86   SpO2: 96%   Weight: 64.4 kg (142 lb)   Height: 1.778 m (5' 10\")     BP Readings from Last 3 Encounters:   12/04/24 136/76   10/23/24 136/86   09/27/24 (!) 140/84     Wt Readings from Last 3 Encounters:   12/04/24 64.4 kg (142 lb)   10/23/24 63.5 kg (140 lb 1.6 oz)   09/27/24 61.2 kg (135 lb)       No results found for: \"LABA1C\"  Lab Results   Component Value Date    CREATININE 0.78 08/15/2024     Lab Results   Component Value Date    ALT 45 (H) 08/15/2024    AST 39 08/15/2024     Lab Results   Component Value Date    CHOL 138 08/15/2024    TRIG 70 08/15/2024    HDL 46 08/15/2024          Physical Exam  Vitals and nursing note reviewed.   Constitutional:       General: He is awake. He is not in acute distress.     Appearance: Normal appearance. He is well-developed. He is not ill-appearing, toxic-appearing or diaphoretic.   HENT:      Head: Normocephalic and atraumatic.      Right Ear: Hearing and external ear normal.      Left Ear: Hearing and external ear normal.      Nose: Nose normal.   Eyes:      General: Lids are normal. Vision grossly intact. Gaze aligned appropriately.      Conjunctiva/sclera: Conjunctivae normal.      Pupils: Pupils are equal, round, and reactive to light.   Cardiovascular:      Rate and Rhythm: Normal rate and regular rhythm.      Pulses: Normal pulses.      Heart sounds: Normal heart sounds, S1 normal and S2 normal.   Pulmonary:      Effort: Pulmonary effort is normal.      Breath sounds: Normal breath sounds and air entry.

## 2024-12-02 ENCOUNTER — TELEMEDICINE (OUTPATIENT)
Dept: INTERVENTIONAL RADIOLOGY/VASCULAR | Age: 64
End: 2024-12-02
Payer: COMMERCIAL

## 2024-12-02 ENCOUNTER — PATIENT MESSAGE (OUTPATIENT)
Dept: INTERVENTIONAL RADIOLOGY/VASCULAR | Age: 64
End: 2024-12-02

## 2024-12-02 DIAGNOSIS — B18.2 HEP C W/O COMA, CHRONIC (HCC): ICD-10-CM

## 2024-12-02 DIAGNOSIS — B18.2 HEP C W/O COMA, CHRONIC (HCC): Primary | ICD-10-CM

## 2024-12-02 DIAGNOSIS — R16.0 LIVER MASS: Primary | ICD-10-CM

## 2024-12-02 PROCEDURE — 99422 OL DIG E/M SVC 11-20 MIN: CPT | Performed by: NURSE PRACTITIONER

## 2024-12-02 RX ORDER — VELPATASVIR AND SOFOSBUVIR 50; 200 MG/1; MG/1
400 TABLET, FILM COATED ORAL DAILY
Qty: 56 TABLET | Refills: 2 | Status: ACTIVE | OUTPATIENT
Start: 2024-12-02 | End: 2024-12-30

## 2024-12-02 NOTE — PROGRESS NOTES
(CARDIA)     Difficulty of Paying Living Expenses: Not hard at all   Food Insecurity: No Food Insecurity (8/15/2024)    Hunger Vital Sign     Worried About Running Out of Food in the Last Year: Never true     Ran Out of Food in the Last Year: Never true   Transportation Needs: Unknown (8/15/2024)    PRAPARE - Transportation     Lack of Transportation (Non-Medical): No   Housing Stability: Unknown (8/15/2024)    Housing Stability Vital Sign     Homeless in the Last Year: No       No Known Allergies    Current Outpatient Medications on File Prior to Visit   Medication Sig Dispense Refill    hydrocortisone (ANUSOL-HC) 25 MG suppository Place 1 suppository rectally in the morning and 1 suppository in the evening. 14 suppository 3    amLODIPine (NORVASC) 5 MG tablet Take 1 tablet by mouth daily 90 tablet 0    tiZANidine (ZANAFLEX) 4 MG tablet Take 1 tablet by mouth 3 times daily as needed      amitriptyline (ELAVIL) 50 MG tablet Take 1 tablet by mouth nightly      Blood Pressure KIT 1 each by Does not apply route daily 1 kit 0     No current facility-administered medications on file prior to visit.       Review of Systems   All other systems reviewed and are negative.      OBJECTIVE:  There were no vitals taken for this visit.    Physical exam not performed as this is a telephone encounter.     Labs from oncology center on 11/22/2024:  WBCs 10.9, Hgb 15.4, platelets 231    Lab Results   Component Value Date     08/15/2024    K 4.0 08/15/2024     08/15/2024    CO2 28 08/15/2024    BUN 16 08/15/2024    CREATININE 0.78 08/15/2024    GLUCOSE 83 08/15/2024    CALCIUM 9.3 08/15/2024    BILITOT 0.6 08/15/2024    ALKPHOS 92 08/15/2024    AST 39 08/15/2024    ALT 45 (H) 08/15/2024    LABGLOM >90.0 08/15/2024    GLOB 2.8 08/15/2024     Lab Results   Component Value Date/Time     08/15/2024 04:38 PM    K 4.0 08/15/2024 04:38 PM     08/15/2024 04:38 PM    CO2 28 08/15/2024 04:38 PM    BUN 16 08/15/2024 04:38

## 2024-12-04 ENCOUNTER — OFFICE VISIT (OUTPATIENT)
Age: 64
End: 2024-12-04
Payer: COMMERCIAL

## 2024-12-04 ENCOUNTER — HOSPITAL ENCOUNTER (OUTPATIENT)
Dept: CT IMAGING | Age: 64
Discharge: HOME | End: 2024-12-06
Payer: COMMERCIAL

## 2024-12-04 VITALS
BODY MASS INDEX: 20.33 KG/M2 | HEIGHT: 70 IN | HEART RATE: 86 BPM | SYSTOLIC BLOOD PRESSURE: 136 MMHG | OXYGEN SATURATION: 96 % | WEIGHT: 142 LBS | DIASTOLIC BLOOD PRESSURE: 76 MMHG

## 2024-12-04 DIAGNOSIS — I10 PRIMARY HYPERTENSION: ICD-10-CM

## 2024-12-04 DIAGNOSIS — R16.0 LIVER MASS: Primary | ICD-10-CM

## 2024-12-04 DIAGNOSIS — C22.0 HEPATOCELLULAR CARCINOMA (HCC): ICD-10-CM

## 2024-12-04 PROCEDURE — 3078F DIAST BP <80 MM HG: CPT | Performed by: PHYSICIAN ASSISTANT

## 2024-12-04 PROCEDURE — 99213 OFFICE O/P EST LOW 20 MIN: CPT | Performed by: PHYSICIAN ASSISTANT

## 2024-12-04 PROCEDURE — 3075F SYST BP GE 130 - 139MM HG: CPT | Performed by: PHYSICIAN ASSISTANT

## 2024-12-04 PROCEDURE — A9609 HC RX DIAGNOSTIC RADIOPHARMACEUTICAL: HCPCS | Performed by: INTERNAL MEDICINE

## 2024-12-04 PROCEDURE — 3430000000 HC RX DIAGNOSTIC RADIOPHARMACEUTICAL: Performed by: INTERNAL MEDICINE

## 2024-12-04 PROCEDURE — 78815 PET IMAGE W/CT SKULL-THIGH: CPT

## 2024-12-04 RX ORDER — FLUDEOXYGLUCOSE F 18 200 MCI/ML
15.8 INJECTION, SOLUTION INTRAVENOUS
Status: COMPLETED | OUTPATIENT
Start: 2024-12-04 | End: 2024-12-04

## 2024-12-04 RX ORDER — AMLODIPINE BESYLATE 5 MG/1
5 TABLET ORAL DAILY
Qty: 90 TABLET | Refills: 0 | Status: SHIPPED | OUTPATIENT
Start: 2024-12-04

## 2024-12-04 RX ADMIN — FLUDEOXYGLUCOSE F 18 15.8 MILLICURIE: 200 INJECTION, SOLUTION INTRAVENOUS at 12:05

## 2024-12-04 ASSESSMENT — ENCOUNTER SYMPTOMS
DIARRHEA: 0
NAUSEA: 0
SINUS PAIN: 0
ABDOMINAL PAIN: 0
VOMITING: 0
CHEST TIGHTNESS: 0
SORE THROAT: 0
BACK PAIN: 0
SINUS PRESSURE: 0
COUGH: 0
SHORTNESS OF BREATH: 0

## 2024-12-04 ASSESSMENT — VISUAL ACUITY: OU: 1

## 2024-12-06 ENCOUNTER — NURSE ONLY (OUTPATIENT)
Dept: INFECTIOUS DISEASES | Age: 64
End: 2024-12-06

## 2024-12-06 DIAGNOSIS — B18.2 CHRONIC HEPATITIS C WITH HEPATIC COMA (HCC): Primary | ICD-10-CM

## 2024-12-06 NOTE — PROGRESS NOTES
Mr. Mathias called into clinic for Hep C teaching.  Patient states he has support of his brother and family.      GENOTYPE 3A   VIRAL LOAD 6.37   FIBROSCAN  F4  EPCLUSA TX DURATION:   12- UNTIL 02- (12) WEEKS TOTAL)        Discussed with patient the goal of achieving viral load suppression. Discussed the importance of routine lab work. Discussed possible side effects and tips for successful treatment.   Advised patient to eat healthy, avoid fatty foods, increase water intake, reduce caffeine, and avoid milk products 4 hours prior to taking medication and 4 hours after. No illegal drugs and/or alcohol.    Mr. Mathias seemed confident and happy about the start of treatment.     Mr. Mathias understood to take 1 pill once a day at the same timeframe daily.  Never skip any doses. Never double doses in the same day.  Patient displayed complete understanding of the dosage and plans to start medication on  12-  Patient understands to go immediately to the emergency room if experiencing severe side effects, such as chest pain and/or shortness of breath.       Reviewed with patient the regimen for routine lab work due after the 5th week of treatment and 12 th week of treatment.    1st set of labs are due the week of : 01-  Physician will order the next set of labs during the follow-up appointment after 6 weeks of treatment.     Mr. Mathias understands to call [facility] pharmacy at # [phone] opt 2  For 1st refill on 12-27-24 and call for 2nd refill on 01-         PATIENT HAS F/U APPOINTMENT WITH DR. Madeleine Solorzano ON 01-       Offered Mr. Mathias an opportunity for questions and concerns.  Patient instructed to report any side effects or changes in health status. Welcome to call anytime with any updates, questions or concerns.   Encouraged  to have a positive outlook about Hep C treatment.     Hep C guide sheet and lab orders mailed or given to patient.

## 2024-12-07 DIAGNOSIS — B18.2 CHRONIC HEPATITIS C WITH HEPATIC COMA (HCC): ICD-10-CM

## 2024-12-07 LAB
ALBUMIN SERPL-MCNC: 4 G/DL (ref 3.5–4.6)
ALP SERPL-CCNC: 116 U/L (ref 35–104)
ALT SERPL-CCNC: 50 U/L (ref 0–41)
ANION GAP SERPL CALCULATED.3IONS-SCNC: 7 MEQ/L (ref 9–15)
AST SERPL-CCNC: 45 U/L (ref 0–40)
BILIRUB SERPL-MCNC: 0.4 MG/DL (ref 0.2–0.7)
BUN SERPL-MCNC: 15 MG/DL (ref 8–23)
CALCIUM SERPL-MCNC: 9.4 MG/DL (ref 8.5–9.9)
CHLORIDE SERPL-SCNC: 101 MEQ/L (ref 95–107)
CO2 SERPL-SCNC: 30 MEQ/L (ref 20–31)
CREAT SERPL-MCNC: 0.75 MG/DL (ref 0.7–1.2)
ERYTHROCYTE [DISTWIDTH] IN BLOOD BY AUTOMATED COUNT: 13.1 % (ref 11.5–14.5)
GLOBULIN SER CALC-MCNC: 3.5 G/DL (ref 2.3–3.5)
GLUCOSE SERPL-MCNC: 94 MG/DL (ref 70–99)
HCT VFR BLD AUTO: 47.8 % (ref 42–52)
HGB BLD-MCNC: 16.2 G/DL (ref 14–18)
MCH RBC QN AUTO: 32 PG (ref 27–31.3)
MCHC RBC AUTO-ENTMCNC: 33.9 % (ref 33–37)
MCV RBC AUTO: 94.5 FL (ref 79–92.2)
PLATELET # BLD AUTO: 194 K/UL (ref 130–400)
POTASSIUM SERPL-SCNC: 4.4 MEQ/L (ref 3.4–4.9)
PROT SERPL-MCNC: 7.5 G/DL (ref 6.3–8)
RBC # BLD AUTO: 5.06 M/UL (ref 4.7–6.1)
SODIUM SERPL-SCNC: 138 MEQ/L (ref 135–144)
WBC # BLD AUTO: 8 K/UL (ref 4.8–10.8)

## 2024-12-11 ENCOUNTER — TELEPHONE (OUTPATIENT)
Dept: INTERVENTIONAL RADIOLOGY/VASCULAR | Age: 64
End: 2024-12-11

## 2024-12-11 NOTE — TELEPHONE ENCOUNTER
Patient was given this information via [employer] conversation- voiced understanding  2.  Spoke to Geeta in [facility] to schedule procedure    >  Liver mass biopsy and microwave ablation is scheduled on 12/18/2024 @ 8:00 am   >  You will need to arrive at 6:30 am from home and check in at the Diagnostic Imaging Check In desk.   >  Do not eat or drink after midnight.    >  Make arrangements for transportation, as you should not drive immediately after.  >  Patient to hold Advil/Motrin/Ibuprofen for 1 day prior to procedure  - starting 12/17/2024  >  Patient to have PT/INR and CBC drawn anytime between now and 1 day prior to procedure

## 2024-12-12 DIAGNOSIS — C22.0 HEPATOCELLULAR CARCINOMA (HCC): Primary | ICD-10-CM

## 2024-12-12 DIAGNOSIS — R16.0 LIVER MASS: ICD-10-CM

## 2024-12-12 LAB
HCV RNA SERPL NAA+PROBE-ACNC: ABNORMAL IU/ML
HCV RNA SERPL NAA+PROBE-LOG IU: 4.04 LOG IU/ML
HCV RNA SERPL QL NAA+PROBE: DETECTED

## 2024-12-14 ENCOUNTER — PATIENT MESSAGE (OUTPATIENT)
Dept: INTERVENTIONAL RADIOLOGY/VASCULAR | Age: 64
End: 2024-12-14

## 2024-12-14 DIAGNOSIS — R16.0 LIVER MASS: ICD-10-CM

## 2024-12-14 DIAGNOSIS — C22.0 HEPATOCELLULAR CARCINOMA (HCC): ICD-10-CM

## 2024-12-14 LAB
INR PPP: 1
PROTHROMBIN TIME: 13.4 SEC (ref 12.3–14.9)

## 2024-12-17 ENCOUNTER — PRE-PROCEDURE TELEPHONE (OUTPATIENT)
Dept: INTERVENTIONAL RADIOLOGY/VASCULAR | Age: 64
End: 2024-12-17

## 2024-12-17 NOTE — FLOWSHEET NOTE
Call to patient to review pre procedural instructions.     No answer, VM left with the following info:     Procedure scheduled on 12/18/24.    >  You will need to arrive at 6:30 am from home and check in at the Diagnostic Imaging Check In desk.   >  Do not eat or drink after midnight.   >  Make arrangements for transportation, as you should not drive immediately after as you will received anesthesia.   >  Patient to hold Advil/Motrin/Ibuprofen for 1 day prior to procedure  - starting 12/17/2024     Call back number left for questions or concerns. Electronically signed by Iris Shah RN on 12/17/2024 at 10:34 AM

## 2024-12-18 ENCOUNTER — ANESTHESIA EVENT (OUTPATIENT)
Dept: CT IMAGING | Age: 64
End: 2024-12-18
Payer: COMMERCIAL

## 2024-12-18 ENCOUNTER — ANESTHESIA (OUTPATIENT)
Dept: CT IMAGING | Age: 64
End: 2024-12-18
Payer: COMMERCIAL

## 2024-12-18 ENCOUNTER — HOSPITAL ENCOUNTER (OUTPATIENT)
Dept: CT IMAGING | Age: 64
Discharge: HOME | End: 2024-12-20
Payer: COMMERCIAL

## 2024-12-18 ENCOUNTER — HOSPITAL ENCOUNTER (OUTPATIENT)
Dept: ULTRASOUND IMAGING | Age: 64
Discharge: HOME | End: 2024-12-20
Payer: COMMERCIAL

## 2024-12-18 VITALS
DIASTOLIC BLOOD PRESSURE: 70 MMHG | RESPIRATION RATE: 9 BRPM | OXYGEN SATURATION: 95 % | WEIGHT: 140 LBS | BODY MASS INDEX: 20.04 KG/M2 | SYSTOLIC BLOOD PRESSURE: 139 MMHG | HEART RATE: 64 BPM | TEMPERATURE: 98.3 F | HEIGHT: 70 IN

## 2024-12-18 DIAGNOSIS — R16.0 LIVER MASS: ICD-10-CM

## 2024-12-18 PROCEDURE — 77012 CT SCAN FOR NEEDLE BIOPSY: CPT

## 2024-12-18 PROCEDURE — 76705 ECHO EXAM OF ABDOMEN: CPT

## 2024-12-18 PROCEDURE — 36415 COLL VENOUS BLD VENIPUNCTURE: CPT

## 2024-12-18 PROCEDURE — 6360000002 HC RX W HCPCS: Performed by: ANESTHESIOLOGIST ASSISTANT

## 2024-12-18 PROCEDURE — 88307 TISSUE EXAM BY PATHOLOGIST: CPT

## 2024-12-18 PROCEDURE — 88342 IMHCHEM/IMCYTCHM 1ST ANTB: CPT

## 2024-12-18 PROCEDURE — 88341 IMHCHEM/IMCYTCHM EA ADD ANTB: CPT

## 2024-12-18 PROCEDURE — 7100000010 HC PHASE II RECOVERY - FIRST 15 MIN

## 2024-12-18 PROCEDURE — 2580000003 HC RX 258: Performed by: ANESTHESIOLOGIST ASSISTANT

## 2024-12-18 PROCEDURE — 2709999900 CT GUIDED VISCERAL TISSUE ABLATION

## 2024-12-18 PROCEDURE — 7100000011 HC PHASE II RECOVERY - ADDTL 15 MIN

## 2024-12-18 PROCEDURE — 6360000002 HC RX W HCPCS: Performed by: RADIOLOGY

## 2024-12-18 PROCEDURE — 6370000000 HC RX 637 (ALT 250 FOR IP): Performed by: RADIOLOGY

## 2024-12-18 RX ORDER — PROPOFOL 10 MG/ML
INJECTION, EMULSION INTRAVENOUS
Status: DISCONTINUED | OUTPATIENT
Start: 2024-12-18 | End: 2024-12-18 | Stop reason: SDUPTHER

## 2024-12-18 RX ORDER — FENTANYL CITRATE 0.05 MG/ML
50 INJECTION, SOLUTION INTRAMUSCULAR; INTRAVENOUS ONCE
Status: COMPLETED | OUTPATIENT
Start: 2024-12-18 | End: 2024-12-18

## 2024-12-18 RX ORDER — LIDOCAINE HYDROCHLORIDE 20 MG/ML
INJECTION, SOLUTION INFILTRATION; PERINEURAL PRN
Status: COMPLETED | OUTPATIENT
Start: 2024-12-18 | End: 2024-12-18

## 2024-12-18 RX ORDER — OXYCODONE AND ACETAMINOPHEN 5; 325 MG/1; MG/1
1 TABLET ORAL
Status: COMPLETED | OUTPATIENT
Start: 2024-12-18 | End: 2024-12-18

## 2024-12-18 RX ORDER — DIPHENHYDRAMINE HYDROCHLORIDE 50 MG/ML
12.5 INJECTION INTRAMUSCULAR; INTRAVENOUS
OUTPATIENT
Start: 2024-12-18 | End: 2024-12-19

## 2024-12-18 RX ORDER — ONDANSETRON 2 MG/ML
4 INJECTION INTRAMUSCULAR; INTRAVENOUS
OUTPATIENT
Start: 2024-12-18 | End: 2024-12-19

## 2024-12-18 RX ORDER — MEPERIDINE HYDROCHLORIDE 25 MG/ML
12.5 INJECTION INTRAMUSCULAR; INTRAVENOUS; SUBCUTANEOUS
OUTPATIENT
Start: 2024-12-18 | End: 2024-12-19

## 2024-12-18 RX ORDER — FENTANYL CITRATE 0.05 MG/ML
50 INJECTION, SOLUTION INTRAMUSCULAR; INTRAVENOUS EVERY 10 MIN PRN
OUTPATIENT
Start: 2024-12-18

## 2024-12-18 RX ORDER — NEOMYCIN/BACITRACIN/POLYMYXINB 3.5-400-5K
OINTMENT (GRAM) TOPICAL PRN
Status: COMPLETED | OUTPATIENT
Start: 2024-12-18 | End: 2024-12-18

## 2024-12-18 RX ORDER — FENTANYL CITRATE 50 UG/ML
INJECTION, SOLUTION INTRAMUSCULAR; INTRAVENOUS
Status: DISCONTINUED | OUTPATIENT
Start: 2024-12-18 | End: 2024-12-18 | Stop reason: SDUPTHER

## 2024-12-18 RX ORDER — SODIUM CHLORIDE 9 MG/ML
INJECTION, SOLUTION INTRAVENOUS PRN
OUTPATIENT
Start: 2024-12-18

## 2024-12-18 RX ORDER — SODIUM CHLORIDE 9 MG/ML
INJECTION, SOLUTION INTRAVENOUS
Status: COMPLETED
Start: 2024-12-18 | End: 2024-12-18

## 2024-12-18 RX ORDER — METOCLOPRAMIDE HYDROCHLORIDE 5 MG/ML
10 INJECTION INTRAMUSCULAR; INTRAVENOUS
OUTPATIENT
Start: 2024-12-18 | End: 2024-12-19

## 2024-12-18 RX ORDER — SODIUM CHLORIDE 0.9 % (FLUSH) 0.9 %
5-40 SYRINGE (ML) INJECTION EVERY 12 HOURS SCHEDULED
OUTPATIENT
Start: 2024-12-18

## 2024-12-18 RX ORDER — OXYCODONE HYDROCHLORIDE 5 MG/1
5 TABLET ORAL
OUTPATIENT
Start: 2024-12-18 | End: 2024-12-19

## 2024-12-18 RX ORDER — LIDOCAINE HYDROCHLORIDE 10 MG/ML
1 INJECTION, SOLUTION EPIDURAL; INFILTRATION; INTRACAUDAL; PERINEURAL
OUTPATIENT
Start: 2024-12-18 | End: 2024-12-19

## 2024-12-18 RX ORDER — SODIUM CHLORIDE 0.9 % (FLUSH) 0.9 %
5-40 SYRINGE (ML) INJECTION PRN
OUTPATIENT
Start: 2024-12-18

## 2024-12-18 RX ORDER — SODIUM CHLORIDE 9 MG/ML
INJECTION, SOLUTION INTRAVENOUS
Status: DISCONTINUED | OUTPATIENT
Start: 2024-12-18 | End: 2024-12-18 | Stop reason: SDUPTHER

## 2024-12-18 RX ORDER — NALOXONE HYDROCHLORIDE 0.4 MG/ML
INJECTION, SOLUTION INTRAMUSCULAR; INTRAVENOUS; SUBCUTANEOUS PRN
OUTPATIENT
Start: 2024-12-18

## 2024-12-18 RX ADMIN — FENTANYL CITRATE 25 MCG: 50 INJECTION, SOLUTION INTRAMUSCULAR; INTRAVENOUS at 09:28

## 2024-12-18 RX ADMIN — SODIUM CHLORIDE: 9 INJECTION, SOLUTION INTRAVENOUS at 08:06

## 2024-12-18 RX ADMIN — FENTANYL CITRATE 50 MCG: 50 INJECTION, SOLUTION INTRAMUSCULAR; INTRAVENOUS at 08:45

## 2024-12-18 RX ADMIN — GELATIN ABSORBABLE SPONGE SIZE 100 1 EACH: MISC at 09:01

## 2024-12-18 RX ADMIN — LIDOCAINE HYDROCHLORIDE 9 ML: 20 INJECTION, SOLUTION INFILTRATION; PERINEURAL at 08:50

## 2024-12-18 RX ADMIN — FENTANYL CITRATE 50 MCG: 50 INJECTION INTRAMUSCULAR; INTRAVENOUS at 09:57

## 2024-12-18 RX ADMIN — FENTANYL CITRATE 25 MCG: 50 INJECTION, SOLUTION INTRAMUSCULAR; INTRAVENOUS at 09:13

## 2024-12-18 RX ADMIN — OXYCODONE HYDROCHLORIDE AND ACETAMINOPHEN 1 TABLET: 5; 325 TABLET ORAL at 10:35

## 2024-12-18 RX ADMIN — FENTANYL CITRATE 50 MCG: 50 INJECTION, SOLUTION INTRAMUSCULAR; INTRAVENOUS at 08:40

## 2024-12-18 RX ADMIN — BACITRACIN, NEOMYCIN, POLYMYXIN B 1 EACH: 400; 3.5; 5 OINTMENT TOPICAL at 09:36

## 2024-12-18 RX ADMIN — PROPOFOL 100 MCG/KG/MIN: 10 INJECTION, EMULSION INTRAVENOUS at 08:09

## 2024-12-18 RX ADMIN — PROPOFOL 50 MG: 10 INJECTION, EMULSION INTRAVENOUS at 08:17

## 2024-12-18 RX ADMIN — FENTANYL CITRATE 50 MCG: 50 INJECTION, SOLUTION INTRAMUSCULAR; INTRAVENOUS at 09:02

## 2024-12-18 ASSESSMENT — PAIN DESCRIPTION - PAIN TYPE: TYPE: SURGICAL PAIN

## 2024-12-18 ASSESSMENT — PAIN DESCRIPTION - DESCRIPTORS: DESCRIPTORS: SHARP;SHOOTING

## 2024-12-18 ASSESSMENT — PAIN DESCRIPTION - ORIENTATION
ORIENTATION: RIGHT
ORIENTATION: RIGHT

## 2024-12-18 ASSESSMENT — PAIN SCALES - GENERAL
PAINLEVEL_OUTOF10: 10
PAINLEVEL_OUTOF10: 5
PAINLEVEL_OUTOF10: 7

## 2024-12-18 ASSESSMENT — PAIN DESCRIPTION - LOCATION
LOCATION: ABDOMEN
LOCATION: ABDOMEN

## 2024-12-18 NOTE — ANESTHESIA POSTPROCEDURE EVALUATION
Department of Anesthesiology  Postprocedure Note    Patient: Fahad Mathias  MRN: [id number]  YOB: 1960  Date of evaluation: 12/18/2024    Procedure Summary       Date: 12/18/24 Room / Location: [facility] Vascular and Interventional Radiology; [facility] CT Scan    Anesthesia Start: 0806 Anesthesia Stop: 0949    Procedures:       CT GUIDED NEEDLE PLACEMENT      CT NEEDLE BIOPSY LIVER PERCUTANEOUS Diagnosis: Liver mass    Scheduled Providers: Mello Nicholson MD Responsible Provider: Matthew Abreu DO    Anesthesia Type: MAC ASA Status: 2            Anesthesia Type: No value filed.    Molina Phase I: Molina Score: 10    Molina Phase II:      Anesthesia Post Evaluation    Patient location during evaluation: bedside  Patient participation: complete - patient participated  Level of consciousness: awake and awake and alert  Airway patency: patent  Nausea & Vomiting: no nausea and no vomiting  Cardiovascular status: blood pressure returned to baseline and hemodynamically stable  Respiratory status: acceptable  Hydration status: euvolemic  Pain management: adequate        No notable events documented.

## 2024-12-18 NOTE — FLOWSHEET NOTE
0640 - Patient brought back to CT holding, steady independent gait. A&Ox4, calm and cooperative. When questioned about NPO status, patient reports he had 2 cups of coffee this AM at 0330. No food since 1700 yesterday. No medications taken today. Denies taking a blood thinner. No advil / motrin / ibuprofen taken in \"week.\" Message sent to Dr. Nicholson. Call to surgery charge - message left for anesthesia to notify. Awaiting response.     Patient settled onto cart. Out of clothes and into gown. Pre-procedure checklist completed - allergies, home medications and history reviewed.     Per Dr. Nicholson and Dr. Abreu ok to proceed with procedure this AM.     IV #20 inserted RUE using u/s guidance - tolerated well.     Procedure explained and consent signed. Dr. Abreu and Dr. Nicholson at cart side to evaluate patient and review procedure.     0757 - Into Ct via cart for procedure. Electronically signed by Soo Hoffman RN on 12/18/2024 at 7:57 AM

## 2024-12-18 NOTE — ANESTHESIA PRE PROCEDURE
Department of Anesthesiology  Preprocedure Note       Name:  Fahad Mathias   Age:  63 y.o.  :  1960                                          MRN:  [id number]         Date:  2024      Surgeon: * No surgeons listed *    Procedure: * No procedures listed *    Medications prior to admission:   Prior to Admission medications    Medication Sig Start Date End Date Taking? Authorizing Provider   amLODIPine (NORVASC) 5 MG tablet Take 1 tablet by mouth daily 24  Yes Vikas Pineda PA   Sofosbuvir-Velpatasvir (EPCLUSA) 200-50 MG TABS Take 400 mg by mouth daily for 28 days 24 Yes Madeleine Solorzano MD   amitriptyline (ELAVIL) 50 MG tablet Take 1 tablet by mouth nightly 24  Yes Pauly Holden MD   hydrocortisone (ANUSOL-HC) 25 MG suppository Place 1 suppository rectally in the morning and 1 suppository in the evening. 24   Alejandro Meredith MD   tiZANidine (ZANAFLEX) 4 MG tablet Take 1 tablet by mouth 3 times daily as needed 24   Pauly Holden MD   Blood Pressure KIT 1 each by Does not apply route daily 8/15/24   Vikas Pineda PA       Current medications:    Current Outpatient Medications   Medication Sig Dispense Refill    amLODIPine (NORVASC) 5 MG tablet Take 1 tablet by mouth daily 90 tablet 0    Sofosbuvir-Velpatasvir (EPCLUSA) 200-50 MG TABS Take 400 mg by mouth daily for 28 days 56 tablet 2    amitriptyline (ELAVIL) 50 MG tablet Take 1 tablet by mouth nightly      hydrocortisone (ANUSOL-HC) 25 MG suppository Place 1 suppository rectally in the morning and 1 suppository in the evening. 14 suppository 3    tiZANidine (ZANAFLEX) 4 MG tablet Take 1 tablet by mouth 3 times daily as needed      Blood Pressure KIT 1 each by Does not apply route daily 1 kit 0     Current Facility-Administered Medications   Medication Dose Route Frequency Provider Last Rate Last Admin    sodium chloride 0.9 % infusion                Allergies:  No Known Allergies    Problem List:  There is no problem

## 2024-12-18 NOTE — FLOWSHEET NOTE
Pt expresses concerns for pain control when he discharges. Message sent to Dr. Nicholson to request pain meds for discharge.     Dr. Nicholson recommending advil 600 mg every 6 hrs for two days to assist with pain secondary to capsule inflammation.     Pt agrees that he will get Advil OTC as he does not need a script for it. Electronically signed by Iris Shah RN on 12/18/2024 at 11:51 AM       I removed, bandage applied. Liver bandage site is c/d/I without complication. Pt reports pain is a 4/10 when he takes a deep breath in but it better than when he returned from procedure.  Electronically signed by Iris Shah RN on 12/18/2024 at 11:56 AM       Pts VSS. Pt dressing into street clothes at this time. Discharge instructions previously provided and reviewed with patient. Pt has no questions or concerns at this time.     Pt taken via WC to discharge exit. Pts friend to drive him home. Pt tolerated well.  Electronically signed by Iris Shah RN on 12/18/2024 at 12:03 PM

## 2024-12-18 NOTE — FLOWSHEET NOTE
0953 - Patient brought back to CT holding via cart, accompanied by procedural RN and CRNA. Report received. Patient starting to wake up, pulling at wires and oxygen mask. Initially not responding to verbal direction but quickly arouses and responding. VSS. Returned on an oxygen mask but moved to nasal canula - SPO2 stable. No resp distress noted. Procedural site assessed and band aid is clean / dry / intact. No bleeding / drainage / swelling noted. Patient reports acute pain 7/10 - grimacing, holding right side and fidgeting on the cart, unable to sit still. States pain is \"constant\" \"stabbing\" \"sharp\" - repositioning doesn't help, worse with every breath taken in. Dr. Nicholson in CT holding to assess and IV fentanyl ordered / administered.     1007 - Dr. Nicholson back at cart side to evaluate. Oral percocet ordered to administer \"in 15-20 minutes if pain level doesn't improve.\" Patient again repositioned lying right side down. Sipping on water per request, refusing food / snacks at this time. VS remain stable.     1024 - Patient reports initially pain level eased after fentanyl but that didn't last. Pharmacy messaged for percocet dose to be dispensed. Procedural site assessed and remains WNL.     1035 - Percocet 1 tab administered per order, see eMAR. Emotional support given to patient.     1104 - Resting on cart, appears more comfortable. Reports pain is \"easing / dulling\" rated 5/10. VS remain stable and procedural site WNL. Friend Christopher called and updated - plan is to  for discharge at 1200. Voided 150cc clear yellow urine in urinal.     1132 - D/C instructions reviewed with patient by KASSIE, RN and understanding indicated.

## 2024-12-18 NOTE — OR NURSING
MAC ANESTHESIA CASE - CT BIOPSY/ABLATION OF LIVER LESION     0805 Patient positioned supine on CT table.  Monitor applied.  VSS.      0806 Preanesthesia timeout performed. Pts vitals being monitored by anesthesia at this time. Anesthesia administered per CRNA.     0810 CT scans done.     0814 Dr. Nicholson reviewing scans.     0839 Timeout completed. Dr Nicholson reviewed scans, RUQ (liver)  site marked, prepped with Chloraprep. After 3 minute dry time, sterile full body drape applied.    0850 Skin numbed with Lidocaine 2% by Dr Nicholson.      0853 Liver lesion being assessed with ultrasound at this time. VSS and anesthesia continues to monitor patient and provide sedation.     0855 CT Fluoro guidance used to place introducer.    0859 [employer] core biopsy needle 18 g x 10 cm used to obtain a total of 2 samples. Samples placed into formalin.    0901 Introducer withdrawn as gelfoam inserted,  pressure held momentarily.  Patient tolerated biopsy fairly well.     0903 HS AMICA PROBE 16 g x 150 mm placed onto sterile field and tested for defects by Dr Nicholson. Catheter placed into procedure site and advanced using CT guidance.     0911 Ablation started per Truman (rep).     0913 Specimen taken to lab by EZRA MOHR.     0921 Ablation stopped per Truman (rep).     0928 Ablation start per Truman (rep).     0934 Ablation stopped per Truman (rep).     0934 Tumor ablated 2 times for a total of 16 minutes of burn time at 40 perera.  Patient tolerated well.  Vital signs remain stable. Probe removed. Additional ct scans obtained.     0936 Procedure complete.  Neosporin and bandaid applied to procedure site.  Patient assisted back onto cart.    0938 Patient taken to CT Holding Room for Recovery, pt to remain bedrest with right side down for 2 hrs w/monitoring.

## 2024-12-18 NOTE — DISCHARGE INSTRUCTIONS
BIOPSY DISCHARGE INSTRUCTIONS    ACTIVITY:   Rest today. Expect to be sore for 1 to 2 days. No heavy bending, lifting , stretching, pushing or pulling for at least 24 hours. Do not lift anything over 10 pounds for the next 24 - 48 hours. Do not drive today.      BATHING:   May shower, bathe, or swim after 24 hours.  Pat the site dry. May remove large band aid after 24 hours.    DIET:   May resume your normal diet.     MEDICATION:  * Resume home medication unless otherwise instructed by your physician.  * (If Applicable) If taking any blood thinners or Aspirin, hold for 24 hours after biopsy.  * May take mild pain reliever, as needed, such as Acetaminophen or Ibuprofen.      COMPLICATIONS RELATED TO BIOPSY:   - Dizziness, weakness, fatigue  - Bruising/firmness/ and/or pain at the site.  - Extreme pain after leaving the hospital.   - Large or heavy bleeding at biopsy site.   IF THESE SYMPTOMS OCCUR AND BECOME SEVERE, REPORT TO THE EMERGENCY ROOM FOR FURTHER EVALUATION.     For the next 48 hours watch site for redness, drainage, swelling , fever (temp above 101 degrees F), or chills.   If these signs of infection occur contact DR. SHIELDS at [phone].

## 2024-12-19 ENCOUNTER — TELEPHONE (OUTPATIENT)
Dept: INTERVENTIONAL RADIOLOGY/VASCULAR | Age: 64
End: 2024-12-19

## 2024-12-19 NOTE — TELEPHONE ENCOUNTER
Post procedure phone call made to patient re: liver bx/ablation yesterday. Pt states he is still having pain, but it is slowly improving. Dsg remains CD&I. Pt denies any further concerns and was encouraged to call back if pain starts to worsen. Pt verbalized understanding.

## 2024-12-23 DIAGNOSIS — C22.0 HEPATOCELLULAR CARCINOMA (HCC): Primary | ICD-10-CM

## 2024-12-23 NOTE — PROGRESS NOTES
@/Malena: Please call patient and make sure he schedules CT abdomen for 1 month after his recent liver biopsy and microwave ablation. Also schedule a follow up with me 7 days after CT to discuss results. Thank you.

## 2025-01-08 ENCOUNTER — TELEPHONE (OUTPATIENT)
Dept: INTERVENTIONAL RADIOLOGY/VASCULAR | Age: 65
End: 2025-01-08

## 2025-01-08 NOTE — TELEPHONE ENCOUNTER
Patient was given this information via phone conversation - voiced understanding  2.  Spoke to Geeta in Specials to schedule procedure    >  Mediport placement is scheduled on 1/10/2025 @ 9:00 am   >  You will need to arrive at 7:30 am from home and check in at the [facility] Check In desk.   >  Do not eat or drink after midnight.    >  Make arrangements for transportation, as you should not drive immediately after.

## 2025-01-09 ENCOUNTER — TELEPHONE (OUTPATIENT)
Dept: INTERVENTIONAL RADIOLOGY/VASCULAR | Age: 65
End: 2025-01-09

## 2025-01-09 DIAGNOSIS — C22.0 LIVER CELL CARCINOMA (HCC): Primary | ICD-10-CM

## 2025-01-09 NOTE — TELEPHONE ENCOUNTER
Pre procedure phone call made to patient re: mediport insertion on 1/10/25. Pt was provided with the following instructions:    Mediport placement is scheduled on 1/10/2025 @ 9:00 am   You will need to arrive at 7:30 am from home and check in at the [facility] Check In desk.   Do not eat or drink after midnight tonight.    Make arrangements for transportation, as you should not drive immediately after.  Pt verbalized understanding, no questions.

## 2025-01-10 ENCOUNTER — HOSPITAL ENCOUNTER (OUTPATIENT)
Dept: INTERVENTIONAL RADIOLOGY/VASCULAR | Age: 65
Discharge: HOME | End: 2025-01-12
Payer: COMMERCIAL

## 2025-01-10 VITALS
BODY MASS INDEX: 20.04 KG/M2 | RESPIRATION RATE: 16 BRPM | HEIGHT: 70 IN | TEMPERATURE: 98.3 F | HEART RATE: 99 BPM | DIASTOLIC BLOOD PRESSURE: 68 MMHG | WEIGHT: 140 LBS | SYSTOLIC BLOOD PRESSURE: 139 MMHG | OXYGEN SATURATION: 96 %

## 2025-01-10 DIAGNOSIS — C22.0 LIVER CELL CARCINOMA (HCC): ICD-10-CM

## 2025-01-10 PROCEDURE — 7100000011 HC PHASE II RECOVERY - ADDTL 15 MIN

## 2025-01-10 PROCEDURE — 6360000002 HC RX W HCPCS: Performed by: RADIOLOGY

## 2025-01-10 PROCEDURE — 2709999900 HC NON-CHARGEABLE SUPPLY: Performed by: RADIOLOGY

## 2025-01-10 PROCEDURE — 2580000003 HC RX 258: Performed by: RADIOLOGY

## 2025-01-10 PROCEDURE — 76937 US GUIDE VASCULAR ACCESS: CPT | Performed by: RADIOLOGY

## 2025-01-10 PROCEDURE — 77001 FLUOROGUIDE FOR VEIN DEVICE: CPT

## 2025-01-10 PROCEDURE — 7100000010 HC PHASE II RECOVERY - FIRST 15 MIN

## 2025-01-10 PROCEDURE — 2709999900 IR PORT PLACEMENT > 5 YEARS

## 2025-01-10 PROCEDURE — 77001 FLUOROGUIDE FOR VEIN DEVICE: CPT | Performed by: RADIOLOGY

## 2025-01-10 PROCEDURE — 76937 US GUIDE VASCULAR ACCESS: CPT

## 2025-01-10 PROCEDURE — 99152 MOD SED SAME PHYS/QHP 5/>YRS: CPT | Performed by: RADIOLOGY

## 2025-01-10 PROCEDURE — 36561 INSERT TUNNELED CV CATH: CPT

## 2025-01-10 PROCEDURE — 2500000003 HC RX 250 WO HCPCS: Performed by: RADIOLOGY

## 2025-01-10 PROCEDURE — 36561 INSERT TUNNELED CV CATH: CPT | Performed by: RADIOLOGY

## 2025-01-10 RX ORDER — 0.9 % SODIUM CHLORIDE 0.9 %
INTRAVENOUS SOLUTION INTRAVENOUS CONTINUOUS PRN
Status: COMPLETED | OUTPATIENT
Start: 2025-01-10 | End: 2025-01-10

## 2025-01-10 RX ORDER — LIDOCAINE HYDROCHLORIDE AND EPINEPHRINE BITARTRATE 20; .01 MG/ML; MG/ML
INJECTION, SOLUTION SUBCUTANEOUS PRN
Status: COMPLETED | OUTPATIENT
Start: 2025-01-10 | End: 2025-01-10

## 2025-01-10 RX ORDER — MIDAZOLAM HYDROCHLORIDE 2 MG/2ML
INJECTION, SOLUTION INTRAMUSCULAR; INTRAVENOUS PRN
Status: COMPLETED | OUTPATIENT
Start: 2025-01-10 | End: 2025-01-10

## 2025-01-10 RX ORDER — HEPARIN SODIUM (PORCINE) LOCK FLUSH IV SOLN 100 UNIT/ML 100 UNIT/ML
SOLUTION INTRAVENOUS PRN
Status: COMPLETED | OUTPATIENT
Start: 2025-01-10 | End: 2025-01-10

## 2025-01-10 RX ORDER — FENTANYL CITRATE 0.05 MG/ML
INJECTION, SOLUTION INTRAMUSCULAR; INTRAVENOUS PRN
Status: COMPLETED | OUTPATIENT
Start: 2025-01-10 | End: 2025-01-10

## 2025-01-10 RX ADMIN — HEPARIN SODIUM (PORCINE) LOCK FLUSH IV SOLN 100 UNIT/ML 500 UNITS: 100 SOLUTION at 09:17

## 2025-01-10 RX ADMIN — SODIUM CHLORIDE 250 ML: 9 INJECTION, SOLUTION INTRAVENOUS at 09:03

## 2025-01-10 RX ADMIN — CEFAZOLIN 1000 MG: 1 INJECTION, POWDER, FOR SOLUTION INTRAMUSCULAR; INTRAVENOUS at 09:08

## 2025-01-10 RX ADMIN — CEFAZOLIN 1000 MG: 1 INJECTION, POWDER, FOR SOLUTION INTRAMUSCULAR; INTRAVENOUS at 08:14

## 2025-01-10 RX ADMIN — MIDAZOLAM HYDROCHLORIDE 1 MG: 1 INJECTION, SOLUTION INTRAMUSCULAR; INTRAVENOUS at 09:05

## 2025-01-10 RX ADMIN — LIDOCAINE HYDROCHLORIDE,EPINEPHRINE BITARTRATE 36 ML: 20; .01 INJECTION, SOLUTION INFILTRATION; PERINEURAL at 09:07

## 2025-01-10 RX ADMIN — FENTANYL CITRATE 100 MCG: 50 INJECTION INTRAMUSCULAR; INTRAVENOUS at 09:05

## 2025-01-10 ASSESSMENT — PAIN DESCRIPTION - ORIENTATION
ORIENTATION: RIGHT
ORIENTATION: RIGHT

## 2025-01-10 ASSESSMENT — PAIN SCALES - GENERAL
PAINLEVEL_OUTOF10: 2
PAINLEVEL_OUTOF10: 2

## 2025-01-10 ASSESSMENT — PAIN DESCRIPTION - DESCRIPTORS
DESCRIPTORS: DULL
DESCRIPTORS: DULL

## 2025-01-10 ASSESSMENT — PAIN DESCRIPTION - LOCATION
LOCATION: ABDOMEN
LOCATION: ABDOMEN

## 2025-01-10 NOTE — OR NURSING
sheath peeled away.    Dr. Nicholson confirmed catheter tip placement via fluoro. Final image obtained.     0916 - Dr. Nicholson states port aspirates with good blood return and flushes well with antibiotic irrigation filled syringe.     0917 - Mediport instilled with 500units/5ml heparin lock flush solution.     Dr. Nicholson irrigated mediport pocket with antibiotic solution and then sutured pocket with vicryl.     0928 - Topical skin affix applied to Right IJ site and atop closed Right chest pocket. Pt condition unchanged during procedure, tolerated all well.     0930 - Second layer of topical skin affix applied. Waiting for skin affix to dry. Pt continues to rest on table, with verbal support offered. VSS.    0932 - Pt transferred to cart with staff assist, then taken to CT HR for recovery and discharge.       TOTAL SEDATION GIVEN: 100 MCG fentanyl IV                                                1 MG versed IV

## 2025-01-10 NOTE — PRE SEDATION
Sedation Pre-Procedure Note    Patient Name: Fahad Mathias   YOB: 1960  Room/Bed: Room/bed info not found  Medical Record Number: [id number]  Date: 1/10/2025   Time: 8:46 AM       Indication:  Mediport insertion in right IJ    Consent: I have discussed with the patient and/or the patient representative the indication, alternatives, and the possible risks and/or complications of the planned procedure and the anesthesia methods. The patient and/or patient representative appear to understand and agree to proceed.    Vital Signs:   Vitals:    01/10/25 0800   BP: 138/81   Pulse: 87   Resp: 19   Temp: 98.3 °F (36.8 °C)   SpO2: 98%       Past Medical History:   has a past medical history of Back pain and Hypertension.    Past Surgical History:   has a past surgical history that includes Colonoscopy (N/A, 09/27/2024); other surgical history (12/18/2024); and CT NEEDLE BIOPSY LIVER PERCUTANEOUS (12/18/2024).    Medications:   Scheduled Meds:    ceFAZolin (ANCEF) 1,000 mg in sod chloride IRR soln 0.9 % 250 mL  1,000 mg Irrigation Once     Continuous Infusions:   PRN Meds:   Home Meds:   Prior to Admission medications    Medication Sig Start Date End Date Taking? Authorizing Provider   amLODIPine (NORVASC) 5 MG tablet Take 1 tablet by mouth daily 12/4/24  Yes Vikas Pineda PA   hydrocortisone (ANUSOL-HC) 25 MG suppository Place 1 suppository rectally in the morning and 1 suppository in the evening. 9/27/24  Yes Alejandro Meredith MD   tiZANidine (ZANAFLEX) 4 MG tablet Take 1 tablet by mouth 3 times daily as needed 7/22/24  Yes Pauly Holden MD   amitriptyline (ELAVIL) 50 MG tablet Take 1 tablet by mouth nightly 7/6/24  Yes Pauly Holden MD   Blood Pressure KIT 1 each by Does not apply route daily 8/15/24   Vikas Pineda PA     Coumadin Use Last 7 Days:  no  Antiplatelet drug therapy use last 7 days: no  Other anticoagulant use last 7 days: no  Additional Medication Information:  none      Pre-Sedation

## 2025-01-10 NOTE — PROGRESS NOTES
Pt ambulated with steady gait back to CT holding. Pt A&Ox4, respirations even and unlabored, visible skin WNL, tattoo to right thigh). Pt confirmed NPO since after MN and denies any blood thinners. Pt changed independently into hospital gown without difficulty. Pt connected to monitor, SR on monitor. VSS. Pt c/o 2/10 dull pain RUQ abdomen. Pt with recent liver ablation 12/2024. IV #20G started in left AC, GBR, flushed well. Surgical prophylactic antibiotic started and infusing well, see eMar. Dr. Goetz notified pt ready to be seen.Electronically signed by Bessy Steel RN on 1/10/2025 at 8:22 AM  Dr. Goetz at bedside to assess pt, explain procedure and risks/benefits. Consent obtained. Pt awaiting to go to OR.Electronically signed by Bessy Steel RN on 1/10/2025 at 8:47 AM

## 2025-01-10 NOTE — DISCHARGE INSTRUCTIONS
Mediport Placement Discharge Instructions    ACTIVITY:   - Rest today with no heavy lifting, pushing/pulling, bending or stretching for at least 24 hours.   - Resume activity gradually, avoid lifting anything over 10 POUNDS FOR 2 WEEKS.    - No driving for 24 hours.  Use caution with seat belts and/or any strap placed across port site as can place pressure along your     incision line or cause displacement. A small pillow or folded towel may be placed between the strap and your body to pad the site.    - Do not play contact sports, like football.    - Women should wear a bra during the day.    BATHING:   - Glue/skin adhesive is used instead of a bandage. The glue will wear off in time, 5 to 10 days, do NOT scrub when showering. Do NOT use    antibiotic ointment, it can break down the glue.   - May shower after 48 hours.    - Dry your skin by gently patting the site with a clean towel.  - No bath, no hot tub, and/or no swimming for 14 days or until healed and NEVER when a port has needle in place.     DISCOMFORT:   - It is normal to have some discomfort at the incision site and where your catheter was tunneled under your skin for 24 to 48 hours. Some bruising    is to be expected for up to a week. Ice pack for 10 minute intervals to site may help. Be sure to not place ice pack directly on skin.     MEDICATION:   - May use tylenol, ibuprofen, or previously prescribed pain medication to alleviate pain or discomfort. Do not exceed label usage on the bottle.    May resume medications, except any blood thinners resume as per physician.      CARE:   - If your port is not used regularly, it will need to be flushed every 4 to 6 weeks so that it does not clot off or become blocked. This can be done at             the clinic that uses your port for treatments and/or blood draws.    - If your port becomes blocked or does not give a blood return, it may be necessary to have it evaluated. You will need a doctor's order for a

## 2025-01-10 NOTE — PROGRESS NOTES
Pt returned to CT holding via cart and with monitor. Pt A&Ox4, respirations even and unlabored and right mediport procedural site soft and topical skin adhesive c/d/I. Pt denies any pain from mediport placement only his chronic pain that's unchanged and tolerable at this time. ST on monitor, VSS. Pt provided PO fluids and snack and tolerated well. Pt provided discharge instructions and verbalized understanding. After recovery, pt removed from monitor. IV removed with cath intact, no complications, dressing applied. Pt neighbor, Christopher, awaiting pt. Pt changed back into street clothes independently without difficulty. Pt discharged with belongings via w/c to neighbor's vehicle without difficultyby RH-RN.Electronically signed by Bessy Steel RN on 1/10/2025 at 10:16 AM

## 2025-01-13 ENCOUNTER — TELEPHONE (OUTPATIENT)
Dept: INTERVENTIONAL RADIOLOGY/VASCULAR | Age: 65
End: 2025-01-13

## 2025-01-13 NOTE — TELEPHONE ENCOUNTER
Follow up post procedure call: pt states \"there was some soreness for few days but better now\" and per pt minimal bruising no swelling. Pt denies questions or concerns at this time.Electronically signed by Bessy Steel RN on 1/13/2025 at 9:59 AM

## 2025-01-14 ENCOUNTER — OFFICE VISIT (OUTPATIENT)
Dept: INFECTIOUS DISEASES | Age: 65
End: 2025-01-14
Payer: COMMERCIAL

## 2025-01-14 VITALS
BODY MASS INDEX: 20.9 KG/M2 | DIASTOLIC BLOOD PRESSURE: 81 MMHG | TEMPERATURE: 98.6 F | HEIGHT: 70 IN | SYSTOLIC BLOOD PRESSURE: 136 MMHG | RESPIRATION RATE: 18 BRPM | HEART RATE: 92 BPM | WEIGHT: 146 LBS

## 2025-01-14 DIAGNOSIS — C22.9 MALIGNANT NEOPLASM OF LIVER, UNSPECIFIED LIVER MALIGNANCY TYPE (HCC): ICD-10-CM

## 2025-01-14 DIAGNOSIS — B18.2 HEP C W/O COMA, CHRONIC (HCC): Primary | ICD-10-CM

## 2025-01-14 PROCEDURE — 99214 OFFICE O/P EST MOD 30 MIN: CPT | Performed by: INTERNAL MEDICINE

## 2025-01-14 PROCEDURE — 1123F ACP DISCUSS/DSCN MKR DOCD: CPT | Performed by: INTERNAL MEDICINE

## 2025-01-14 RX ORDER — VELPATASVIR AND SOFOSBUVIR 100; 400 MG/1; MG/1
TABLET, FILM COATED ORAL
COMMUNITY
End: 2025-02-28

## 2025-01-14 ASSESSMENT — PATIENT HEALTH QUESTIONNAIRE - PHQ9
SUM OF ALL RESPONSES TO PHQ QUESTIONS 1-9: 0
SUM OF ALL RESPONSES TO PHQ9 QUESTIONS 1 & 2: 0
SUM OF ALL RESPONSES TO PHQ QUESTIONS 1-9: 0
1. LITTLE INTEREST OR PLEASURE IN DOING THINGS: NOT AT ALL
2. FEELING DOWN, DEPRESSED OR HOPELESS: NOT AT ALL

## 2025-01-14 ASSESSMENT — ENCOUNTER SYMPTOMS
SHORTNESS OF BREATH: 1
ABDOMINAL PAIN: 1

## 2025-01-14 NOTE — PROGRESS NOTES
person, place and time, well-developed and well-nourished, in no acute distress  Skin: warm and dry, no rash.   Head: normocephalic and atraumatic  Eyes: anicteric sclerae  ENT: normal mucous membranes. No oral thrush  No thyromegaly or lymphadenopathy  Lungs: normal respiratory effort, clear lungs  Heart normal S1-S2 no murmur  Abdomen: soft, no tenderness, no hepatosplenomegaly no masses or fluid, no varicosities  No leg edema  No erythema, no tenderness  Normal speech and behavior  Normal judgment and thought process  No hep C viral load done since patient started on treatment  FINAL DIAGNOSIS:   LIVER BIOPSY-   METASTATIC POORLY DIFFERENTIATED ADENOCARCINOMA, SEE COMMENT.     An electronic signature was used to authenticate this note.    --Madeleine Solorzano MD

## 2025-01-15 ENCOUNTER — TELEPHONE (OUTPATIENT)
Dept: INTERVENTIONAL RADIOLOGY/VASCULAR | Age: 65
End: 2025-01-15

## 2025-01-15 NOTE — TELEPHONE ENCOUNTER
Please call patient and remind him he needs his post procedure CT after his liver lesion ablation in December 2024 and follow up for results 1 week after (can be virtual). Thank you.

## 2025-01-15 NOTE — TELEPHONE ENCOUNTER
Left message for patient with information on how to contact [facility] and then call our office to schedule follow up to discuss results

## 2025-01-18 DIAGNOSIS — B18.2 HEP C W/O COMA, CHRONIC (HCC): ICD-10-CM

## 2025-01-18 DIAGNOSIS — C22.9 MALIGNANT NEOPLASM OF LIVER, UNSPECIFIED LIVER MALIGNANCY TYPE (HCC): ICD-10-CM

## 2025-01-18 LAB
ALBUMIN SERPL-MCNC: 4.1 G/DL (ref 3.5–4.6)
ALP SERPL-CCNC: 126 U/L (ref 35–104)
ALT SERPL-CCNC: 14 U/L (ref 0–41)
ANION GAP SERPL CALCULATED.3IONS-SCNC: 11 MEQ/L (ref 9–15)
AST SERPL-CCNC: 17 U/L (ref 0–40)
BILIRUB SERPL-MCNC: 0.3 MG/DL (ref 0.2–0.7)
BUN SERPL-MCNC: 10 MG/DL (ref 8–23)
CALCIUM SERPL-MCNC: 9.5 MG/DL (ref 8.5–9.9)
CHLORIDE SERPL-SCNC: 99 MEQ/L (ref 95–107)
CO2 SERPL-SCNC: 28 MEQ/L (ref 20–31)
CREAT SERPL-MCNC: 0.69 MG/DL (ref 0.7–1.2)
ERYTHROCYTE [DISTWIDTH] IN BLOOD BY AUTOMATED COUNT: 12.8 % (ref 11.5–14.5)
GLOBULIN SER CALC-MCNC: 3.7 G/DL (ref 2.3–3.5)
GLUCOSE SERPL-MCNC: 99 MG/DL (ref 70–99)
HCT VFR BLD AUTO: 49.1 % (ref 42–52)
HGB BLD-MCNC: 16.5 G/DL (ref 14–18)
MCH RBC QN AUTO: 31.4 PG (ref 27–31.3)
MCHC RBC AUTO-ENTMCNC: 33.6 % (ref 33–37)
MCV RBC AUTO: 93.5 FL (ref 79–92.2)
PLATELET # BLD AUTO: 241 K/UL (ref 130–400)
POTASSIUM SERPL-SCNC: 4.3 MEQ/L (ref 3.4–4.9)
PROT SERPL-MCNC: 7.8 G/DL (ref 6.3–8)
RBC # BLD AUTO: 5.25 M/UL (ref 4.7–6.1)
SODIUM SERPL-SCNC: 138 MEQ/L (ref 135–144)
WBC # BLD AUTO: 8.7 K/UL (ref 4.8–10.8)

## 2025-01-21 LAB
HCV RNA SERPL NAA+PROBE-ACNC: NOT DETECTED IU/ML
HCV RNA SERPL NAA+PROBE-LOG IU: NOT DETECTED LOG IU/ML
HCV RNA SERPL QL NAA+PROBE: NOT DETECTED

## 2025-01-27 ENCOUNTER — HOSPITAL ENCOUNTER (OUTPATIENT)
Dept: CT IMAGING | Age: 65
Discharge: HOME | End: 2025-01-29
Payer: COMMERCIAL

## 2025-01-27 DIAGNOSIS — C22.0 HEPATOCELLULAR CARCINOMA (HCC): ICD-10-CM

## 2025-01-27 PROCEDURE — 6360000004 HC RX CONTRAST MEDICATION: Performed by: NURSE PRACTITIONER

## 2025-01-27 PROCEDURE — 74175 CTA ABDOMEN W/CONTRAST: CPT | Performed by: RADIOLOGY

## 2025-01-27 PROCEDURE — 74175 CTA ABDOMEN W/CONTRAST: CPT

## 2025-01-27 RX ORDER — IOPAMIDOL 612 MG/ML
75 INJECTION, SOLUTION INTRAVASCULAR
Status: COMPLETED | OUTPATIENT
Start: 2025-01-27 | End: 2025-01-27

## 2025-01-27 RX ADMIN — IOPAMIDOL 75 ML: 612 INJECTION, SOLUTION INTRAVENOUS at 15:46

## 2025-02-09 ENCOUNTER — APPOINTMENT (OUTPATIENT)
Dept: CT IMAGING | Age: 65
DRG: 193 | End: 2025-02-09
Payer: COMMERCIAL

## 2025-02-09 ENCOUNTER — APPOINTMENT (OUTPATIENT)
Dept: GENERAL RADIOLOGY | Age: 65
DRG: 193 | End: 2025-02-09
Payer: COMMERCIAL

## 2025-02-09 ENCOUNTER — HOSPITAL ENCOUNTER (INPATIENT)
Age: 65
LOS: 4 days | Discharge: HOME | DRG: 193 | End: 2025-02-13
Attending: STUDENT IN AN ORGANIZED HEALTH CARE EDUCATION/TRAINING PROGRAM | Admitting: INTERNAL MEDICINE
Payer: COMMERCIAL

## 2025-02-09 DIAGNOSIS — R09.02 HYPOXEMIA: Primary | ICD-10-CM

## 2025-02-09 DIAGNOSIS — J40 BRONCHITIS: ICD-10-CM

## 2025-02-09 DIAGNOSIS — D69.6 THROMBOCYTOPENIA (HCC): ICD-10-CM

## 2025-02-09 DIAGNOSIS — J47.9 BRONCHIECTASIS WITHOUT COMPLICATION (HCC): ICD-10-CM

## 2025-02-09 DIAGNOSIS — J10.1 INFLUENZA A: ICD-10-CM

## 2025-02-09 PROBLEM — J96.01 ACUTE RESPIRATORY FAILURE WITH HYPOXIA: Status: ACTIVE | Noted: 2025-02-09

## 2025-02-09 LAB
ALBUMIN SERPL-MCNC: 3.9 G/DL (ref 3.5–4.6)
ALP SERPL-CCNC: 101 U/L (ref 35–104)
ALT SERPL-CCNC: 44 U/L (ref 0–41)
ANION GAP SERPL CALCULATED.3IONS-SCNC: 12 MEQ/L (ref 9–15)
AST SERPL-CCNC: 55 U/L (ref 0–40)
B PARAP IS1001 DNA NPH QL NAA+NON-PROBE: NOT DETECTED
B PERT.PT PRMT NPH QL NAA+NON-PROBE: NOT DETECTED
BACTERIA URNS QL MICRO: NEGATIVE /HPF
BASE EXCESS ARTERIAL: 3 (ref -3–3)
BASE EXCESS ARTERIAL: 4 (ref -3–3)
BASOPHILS # BLD: 0 K/UL (ref 0–0.2)
BASOPHILS NFR BLD: 0.2 %
BILIRUB SERPL-MCNC: 0.4 MG/DL (ref 0.2–0.7)
BILIRUB UR QL STRIP: NEGATIVE
BNP BLD-MCNC: 207 PG/ML
BUN SERPL-MCNC: 37 MG/DL (ref 8–23)
C PNEUM DNA NPH QL NAA+NON-PROBE: NOT DETECTED
CALCIUM IONIZED: 1.2 MMOL/L (ref 1.12–1.32)
CALCIUM IONIZED: 1.23 MMOL/L (ref 1.12–1.32)
CALCIUM SERPL-MCNC: 9 MG/DL (ref 8.5–9.9)
CHLORIDE SERPL-SCNC: 91 MEQ/L (ref 95–107)
CLARITY UR: CLEAR
CO2 SERPL-SCNC: 28 MEQ/L (ref 20–31)
COLOR UR: YELLOW
CREAT SERPL-MCNC: 0.69 MG/DL (ref 0.7–1.2)
EOSINOPHIL # BLD: 0 K/UL (ref 0–0.7)
EOSINOPHIL NFR BLD: 0.3 %
EPI CELLS #/AREA URNS AUTO: ABNORMAL /HPF (ref 0–5)
ERYTHROCYTE [DISTWIDTH] IN BLOOD BY AUTOMATED COUNT: 13.2 % (ref 11.5–14.5)
FLUAV H3 RNA NPH QL NAA+NON-PROBE: DETECTED
FLUBV RNA NPH QL NAA+NON-PROBE: NOT DETECTED
GLOBULIN SER CALC-MCNC: 3.9 G/DL (ref 2.3–3.5)
GLUCOSE BLD-MCNC: 118 MG/DL (ref 70–99)
GLUCOSE BLD-MCNC: 121 MG/DL (ref 70–99)
GLUCOSE SERPL-MCNC: 111 MG/DL (ref 70–99)
GLUCOSE UR STRIP-MCNC: NEGATIVE MG/DL
HADV DNA NPH QL NAA+NON-PROBE: NOT DETECTED
HCO3 ARTERIAL: 28.5 MMOL/L (ref 21–29)
HCO3 ARTERIAL: 29.3 MMOL/L (ref 21–29)
HCOV 229E RNA NPH QL NAA+NON-PROBE: NOT DETECTED
HCOV HKU1 RNA NPH QL NAA+NON-PROBE: NOT DETECTED
HCOV NL63 RNA NPH QL NAA+NON-PROBE: NOT DETECTED
HCOV OC43 RNA NPH QL NAA+NON-PROBE: NOT DETECTED
HCT VFR BLD AUTO: 39 % (ref 41–53)
HCT VFR BLD AUTO: 41 % (ref 41–53)
HCT VFR BLD AUTO: 41.1 % (ref 42–52)
HGB BLD CALC-MCNC: 13.3 GM/DL (ref 13.5–17.5)
HGB BLD CALC-MCNC: 13.9 GM/DL (ref 13.5–17.5)
HGB BLD-MCNC: 14.8 G/DL (ref 14–18)
HGB UR QL STRIP: NEGATIVE
HMPV RNA NPH QL NAA+NON-PROBE: NOT DETECTED
HPIV1 RNA NPH QL NAA+NON-PROBE: NOT DETECTED
HPIV2 RNA NPH QL NAA+NON-PROBE: NOT DETECTED
HPIV3 RNA NPH QL NAA+NON-PROBE: NOT DETECTED
HPIV4 RNA NPH QL NAA+NON-PROBE: NOT DETECTED
HYALINE CASTS #/AREA URNS LPF: ABNORMAL /LPF (ref 0–5)
KETONES UR STRIP-MCNC: ABNORMAL MG/DL
LACTATE BLDV-SCNC: 1.9 MMOL/L (ref 0.5–2.2)
LACTATE: 0.68 MMOL/L (ref 0.4–2)
LACTATE: 0.73 MMOL/L (ref 0.4–2)
LEUKOCYTE ESTERASE UR QL STRIP: NEGATIVE
LIPASE SERPL-CCNC: 34 U/L (ref 12–95)
LYMPHOCYTES # BLD: 0.6 K/UL (ref 1–4.8)
LYMPHOCYTES NFR BLD: 9.7 %
M PNEUMO DNA NPH QL NAA+NON-PROBE: NOT DETECTED
MAGNESIUM SERPL-MCNC: 2.4 MG/DL (ref 1.7–2.4)
MCH RBC QN AUTO: 32.1 PG (ref 27–31.3)
MCHC RBC AUTO-ENTMCNC: 36 % (ref 33–37)
MCV RBC AUTO: 89.2 FL (ref 79–92.2)
MONOCYTES # BLD: 0.6 K/UL (ref 0.2–0.8)
MONOCYTES NFR BLD: 10.2 %
NEUTROPHILS # BLD: 4.6 K/UL (ref 1.4–6.5)
NEUTS SEG NFR BLD: 79.3 %
NITRITE UR QL STRIP: NEGATIVE
O2 SAT, ARTERIAL: 98 % (ref 93–100)
O2 SAT, ARTERIAL: 98 % (ref 93–100)
PCO2 ARTERIAL: 49 MM HG (ref 35–45)
PCO2 ARTERIAL: 52 MM HG (ref 35–45)
PERFORMED ON: ABNORMAL
PERFORMED ON: ABNORMAL
PH ARTERIAL: 7.36 (ref 7.35–7.45)
PH ARTERIAL: 7.37 (ref 7.35–7.45)
PH UR STRIP: 5 [PH] (ref 5–9)
PLATELET # BLD AUTO: 76 K/UL (ref 130–400)
PLATELET BLD QL SMEAR: ABNORMAL
PO2 ARTERIAL: 102 MM HG (ref 75–108)
PO2 ARTERIAL: 109 MM HG (ref 75–108)
POC CHLORIDE: 95 MEQ/L (ref 99–110)
POC CHLORIDE: 99 MEQ/L (ref 99–110)
POC CREATININE: 0.6 MG/DL (ref 0.8–1.3)
POC CREATININE: 0.7 MG/DL (ref 0.8–1.3)
POC FIO2: 35
POC FIO2: 5
POC SAMPLE TYPE: ABNORMAL
POC SAMPLE TYPE: ABNORMAL
POTASSIUM SERPL-SCNC: 4.1 MEQ/L (ref 3.5–5.1)
POTASSIUM SERPL-SCNC: 4.2 MEQ/L (ref 3.5–5.1)
POTASSIUM SERPL-SCNC: 4.7 MEQ/L (ref 3.4–4.9)
PROT SERPL-MCNC: 7.8 G/DL (ref 6.3–8)
PROT UR STRIP-MCNC: 30 MG/DL
RBC # BLD AUTO: 4.61 M/UL (ref 4.7–6.1)
RBC #/AREA URNS AUTO: ABNORMAL /HPF (ref 0–5)
RSV RNA NPH QL NAA+NON-PROBE: NOT DETECTED
RV+EV RNA NPH QL NAA+NON-PROBE: NOT DETECTED
SARS-COV-2 RNA NPH QL NAA+NON-PROBE: NOT DETECTED
SODIUM BLD-SCNC: 134 MEQ/L (ref 136–145)
SODIUM BLD-SCNC: 136 MEQ/L (ref 136–145)
SODIUM SERPL-SCNC: 131 MEQ/L (ref 135–144)
SP GR UR STRIP: 1.07 (ref 1–1.03)
TCO2 ARTERIAL: 30 MMOL/L (ref 21–32)
TCO2 ARTERIAL: 31 MMOL/L (ref 21–32)
TROPONIN, HIGH SENSITIVITY: 19 NG/L (ref 0–19)
TROPONIN, HIGH SENSITIVITY: 21 NG/L (ref 0–19)
TROPONIN, HIGH SENSITIVITY: 22 NG/L (ref 0–19)
URINE REFLEX TO CULTURE: ABNORMAL
UROBILINOGEN UR STRIP-ACNC: 1 E.U./DL
WBC # BLD AUTO: 5.8 K/UL (ref 4.8–10.8)
WBC #/AREA URNS AUTO: ABNORMAL /HPF (ref 0–5)

## 2025-02-09 PROCEDURE — 82803 BLOOD GASES ANY COMBINATION: CPT

## 2025-02-09 PROCEDURE — 83690 ASSAY OF LIPASE: CPT

## 2025-02-09 PROCEDURE — 94761 N-INVAS EAR/PLS OXIMETRY MLT: CPT

## 2025-02-09 PROCEDURE — 2500000003 HC RX 250 WO HCPCS: Performed by: STUDENT IN AN ORGANIZED HEALTH CARE EDUCATION/TRAINING PROGRAM

## 2025-02-09 PROCEDURE — 83605 ASSAY OF LACTIC ACID: CPT

## 2025-02-09 PROCEDURE — 83735 ASSAY OF MAGNESIUM: CPT

## 2025-02-09 PROCEDURE — 6360000002 HC RX W HCPCS: Performed by: STUDENT IN AN ORGANIZED HEALTH CARE EDUCATION/TRAINING PROGRAM

## 2025-02-09 PROCEDURE — 6370000000 HC RX 637 (ALT 250 FOR IP): Performed by: STUDENT IN AN ORGANIZED HEALTH CARE EDUCATION/TRAINING PROGRAM

## 2025-02-09 PROCEDURE — 82565 ASSAY OF CREATININE: CPT

## 2025-02-09 PROCEDURE — 87040 BLOOD CULTURE FOR BACTERIA: CPT

## 2025-02-09 PROCEDURE — 71275 CT ANGIOGRAPHY CHEST: CPT

## 2025-02-09 PROCEDURE — 6360000004 HC RX CONTRAST MEDICATION: Performed by: STUDENT IN AN ORGANIZED HEALTH CARE EDUCATION/TRAINING PROGRAM

## 2025-02-09 PROCEDURE — 5A09357 ASSISTANCE WITH RESPIRATORY VENTILATION, LESS THAN 24 CONSECUTIVE HOURS, CONTINUOUS POSITIVE AIRWAY PRESSURE: ICD-10-PCS | Performed by: INTERNAL MEDICINE

## 2025-02-09 PROCEDURE — 36600 WITHDRAWAL OF ARTERIAL BLOOD: CPT

## 2025-02-09 PROCEDURE — 82435 ASSAY OF BLOOD CHLORIDE: CPT

## 2025-02-09 PROCEDURE — 94640 AIRWAY INHALATION TREATMENT: CPT

## 2025-02-09 PROCEDURE — 87150 DNA/RNA AMPLIFIED PROBE: CPT

## 2025-02-09 PROCEDURE — 84295 ASSAY OF SERUM SODIUM: CPT

## 2025-02-09 PROCEDURE — 85025 COMPLETE CBC W/AUTO DIFF WBC: CPT

## 2025-02-09 PROCEDURE — 2060000000 HC ICU INTERMEDIATE R&B

## 2025-02-09 PROCEDURE — 2580000003 HC RX 258: Performed by: STUDENT IN AN ORGANIZED HEALTH CARE EDUCATION/TRAINING PROGRAM

## 2025-02-09 PROCEDURE — 93005 ELECTROCARDIOGRAM TRACING: CPT | Performed by: STUDENT IN AN ORGANIZED HEALTH CARE EDUCATION/TRAINING PROGRAM

## 2025-02-09 PROCEDURE — 84484 ASSAY OF TROPONIN QUANT: CPT

## 2025-02-09 PROCEDURE — 83880 ASSAY OF NATRIURETIC PEPTIDE: CPT

## 2025-02-09 PROCEDURE — 81001 URINALYSIS AUTO W/SCOPE: CPT

## 2025-02-09 PROCEDURE — 2580000003 HC RX 258: Performed by: NURSE PRACTITIONER

## 2025-02-09 PROCEDURE — 80053 COMPREHEN METABOLIC PANEL: CPT

## 2025-02-09 PROCEDURE — 96361 HYDRATE IV INFUSION ADD-ON: CPT

## 2025-02-09 PROCEDURE — 96374 THER/PROPH/DIAG INJ IV PUSH: CPT

## 2025-02-09 PROCEDURE — 82330 ASSAY OF CALCIUM: CPT

## 2025-02-09 PROCEDURE — 94660 CPAP INITIATION&MGMT: CPT

## 2025-02-09 PROCEDURE — 99285 EMERGENCY DEPT VISIT HI MDM: CPT

## 2025-02-09 PROCEDURE — 2500000003 HC RX 250 WO HCPCS: Performed by: NURSE PRACTITIONER

## 2025-02-09 PROCEDURE — 84132 ASSAY OF SERUM POTASSIUM: CPT

## 2025-02-09 PROCEDURE — 36415 COLL VENOUS BLD VENIPUNCTURE: CPT

## 2025-02-09 PROCEDURE — 71045 X-RAY EXAM CHEST 1 VIEW: CPT

## 2025-02-09 PROCEDURE — 85014 HEMATOCRIT: CPT

## 2025-02-09 PROCEDURE — 6360000002 HC RX W HCPCS: Performed by: NURSE PRACTITIONER

## 2025-02-09 PROCEDURE — 0202U NFCT DS 22 TRGT SARS-COV-2: CPT

## 2025-02-09 RX ORDER — ACETAMINOPHEN 325 MG/1
650 TABLET ORAL EVERY 6 HOURS PRN
Status: DISCONTINUED | OUTPATIENT
Start: 2025-02-09 | End: 2025-02-13 | Stop reason: HOSPADM

## 2025-02-09 RX ORDER — SODIUM CHLORIDE 9 MG/ML
INJECTION, SOLUTION INTRAVENOUS PRN
Status: DISCONTINUED | OUTPATIENT
Start: 2025-02-09 | End: 2025-02-13 | Stop reason: HOSPADM

## 2025-02-09 RX ORDER — IPRATROPIUM BROMIDE AND ALBUTEROL SULFATE 2.5; .5 MG/3ML; MG/3ML
1 SOLUTION RESPIRATORY (INHALATION)
Status: DISCONTINUED | OUTPATIENT
Start: 2025-02-10 | End: 2025-02-10

## 2025-02-09 RX ORDER — IOPAMIDOL 612 MG/ML
75 INJECTION, SOLUTION INTRAVASCULAR
Status: COMPLETED | OUTPATIENT
Start: 2025-02-09 | End: 2025-02-09

## 2025-02-09 RX ORDER — LABETALOL HYDROCHLORIDE 5 MG/ML
10 INJECTION, SOLUTION INTRAVENOUS EVERY 4 HOURS PRN
Status: DISCONTINUED | OUTPATIENT
Start: 2025-02-09 | End: 2025-02-13 | Stop reason: HOSPADM

## 2025-02-09 RX ORDER — GUAIFENESIN 200 MG/10ML
200 LIQUID ORAL EVERY 4 HOURS PRN
Status: DISCONTINUED | OUTPATIENT
Start: 2025-02-09 | End: 2025-02-13 | Stop reason: HOSPADM

## 2025-02-09 RX ORDER — POLYETHYLENE GLYCOL 3350 17 G/17G
17 POWDER, FOR SOLUTION ORAL DAILY PRN
Status: DISCONTINUED | OUTPATIENT
Start: 2025-02-09 | End: 2025-02-13 | Stop reason: HOSPADM

## 2025-02-09 RX ORDER — OSELTAMIVIR PHOSPHATE 75 MG/1
75 CAPSULE ORAL ONCE
Status: COMPLETED | OUTPATIENT
Start: 2025-02-09 | End: 2025-02-09

## 2025-02-09 RX ORDER — SODIUM CHLORIDE 0.9 % (FLUSH) 0.9 %
5-40 SYRINGE (ML) INJECTION EVERY 12 HOURS SCHEDULED
Status: DISCONTINUED | OUTPATIENT
Start: 2025-02-09 | End: 2025-02-13 | Stop reason: HOSPADM

## 2025-02-09 RX ORDER — PREDNISONE 20 MG/1
40 TABLET ORAL DAILY
Status: DISCONTINUED | OUTPATIENT
Start: 2025-02-12 | End: 2025-02-09

## 2025-02-09 RX ORDER — PREDNISONE 20 MG/1
40 TABLET ORAL DAILY
Status: DISCONTINUED | OUTPATIENT
Start: 2025-02-11 | End: 2025-02-10

## 2025-02-09 RX ORDER — ONDANSETRON 2 MG/ML
4 INJECTION INTRAMUSCULAR; INTRAVENOUS EVERY 6 HOURS PRN
Status: DISCONTINUED | OUTPATIENT
Start: 2025-02-09 | End: 2025-02-13 | Stop reason: HOSPADM

## 2025-02-09 RX ORDER — IPRATROPIUM BROMIDE AND ALBUTEROL SULFATE 2.5; .5 MG/3ML; MG/3ML
2 SOLUTION RESPIRATORY (INHALATION)
Status: COMPLETED | OUTPATIENT
Start: 2025-02-09 | End: 2025-02-09

## 2025-02-09 RX ORDER — ENOXAPARIN SODIUM 100 MG/ML
40 INJECTION SUBCUTANEOUS DAILY
Status: DISCONTINUED | OUTPATIENT
Start: 2025-02-10 | End: 2025-02-09

## 2025-02-09 RX ORDER — OSELTAMIVIR PHOSPHATE 75 MG/1
75 CAPSULE ORAL 2 TIMES DAILY
Status: DISCONTINUED | OUTPATIENT
Start: 2025-02-10 | End: 2025-02-13 | Stop reason: HOSPADM

## 2025-02-09 RX ORDER — ONDANSETRON 4 MG/1
4 TABLET, ORALLY DISINTEGRATING ORAL EVERY 8 HOURS PRN
Status: DISCONTINUED | OUTPATIENT
Start: 2025-02-09 | End: 2025-02-13 | Stop reason: HOSPADM

## 2025-02-09 RX ORDER — SODIUM CHLORIDE 0.9 % (FLUSH) 0.9 %
5-40 SYRINGE (ML) INJECTION PRN
Status: DISCONTINUED | OUTPATIENT
Start: 2025-02-09 | End: 2025-02-13 | Stop reason: HOSPADM

## 2025-02-09 RX ORDER — ACETAMINOPHEN 650 MG/1
650 SUPPOSITORY RECTAL EVERY 6 HOURS PRN
Status: DISCONTINUED | OUTPATIENT
Start: 2025-02-09 | End: 2025-02-13 | Stop reason: HOSPADM

## 2025-02-09 RX ORDER — SODIUM CHLORIDE 9 MG/ML
INJECTION, SOLUTION INTRAVENOUS CONTINUOUS
Status: DISCONTINUED | OUTPATIENT
Start: 2025-02-09 | End: 2025-02-10

## 2025-02-09 RX ORDER — 0.9 % SODIUM CHLORIDE 0.9 %
1000 INTRAVENOUS SOLUTION INTRAVENOUS ONCE
Status: COMPLETED | OUTPATIENT
Start: 2025-02-09 | End: 2025-02-09

## 2025-02-09 RX ADMIN — IPRATROPIUM BROMIDE AND ALBUTEROL SULFATE 2 DOSE: .5; 2.5 SOLUTION RESPIRATORY (INHALATION) at 21:14

## 2025-02-09 RX ADMIN — SODIUM CHLORIDE, PRESERVATIVE FREE 10 ML: 5 INJECTION INTRAVENOUS at 22:34

## 2025-02-09 RX ADMIN — SODIUM CHLORIDE: 9 INJECTION, SOLUTION INTRAVENOUS at 22:33

## 2025-02-09 RX ADMIN — METHYLPREDNISOLONE SODIUM SUCCINATE 125 MG: 125 INJECTION INTRAMUSCULAR; INTRAVENOUS at 20:34

## 2025-02-09 RX ADMIN — SODIUM CHLORIDE 1000 ML: 9 INJECTION, SOLUTION INTRAVENOUS at 20:04

## 2025-02-09 RX ADMIN — IOPAMIDOL 75 ML: 612 INJECTION, SOLUTION INTRAVENOUS at 19:38

## 2025-02-09 RX ADMIN — LABETALOL HYDROCHLORIDE 10 MG: 5 INJECTION, SOLUTION INTRAVENOUS at 23:28

## 2025-02-09 RX ADMIN — OSELTAMIVIR PHOSPHATE 75 MG: 75 CAPSULE ORAL at 20:32

## 2025-02-09 ASSESSMENT — PAIN SCALES - GENERAL
PAINLEVEL_OUTOF10: 3
PAINLEVEL_OUTOF10: 3

## 2025-02-09 ASSESSMENT — PAIN - FUNCTIONAL ASSESSMENT: PAIN_FUNCTIONAL_ASSESSMENT: NONE - DENIES PAIN

## 2025-02-09 ASSESSMENT — PAIN DESCRIPTION - DESCRIPTORS: DESCRIPTORS: DULL

## 2025-02-09 ASSESSMENT — LIFESTYLE VARIABLES
HOW MANY STANDARD DRINKS CONTAINING ALCOHOL DO YOU HAVE ON A TYPICAL DAY: 1 OR 2
HOW OFTEN DO YOU HAVE A DRINK CONTAINING ALCOHOL: MONTHLY OR LESS

## 2025-02-09 ASSESSMENT — PAIN DESCRIPTION - ORIENTATION: ORIENTATION: RIGHT

## 2025-02-09 ASSESSMENT — PAIN DESCRIPTION - LOCATION: LOCATION: ABDOMEN

## 2025-02-09 NOTE — ED PROVIDER NOTES
[facility] TRAUMA CARE UNIT  EMERGENCY DEPARTMENT ENCOUNTER      Pt Name: Fahad Mathias  MRN: [id number]  Birthdate 1960  Date of evaluation: 2/9/2025  Provider: Jean-Pierre Otero MD  1:25 AM    CHIEF COMPLAINT       Chief Complaint   Patient presents with    Shortness of Breath         HISTORY OF PRESENT ILLNESS    Fahad Mathias is a 64 y.o. male who presents to the emergency department shortness of breath, coughing     HPI  Patient is a 64-year-old male presenting to the ED due to concern for increased shortness of breath.  Patient has a past medical history of hypertension, hep C (on epclusa, recent HCV non-detectable from 1/2025) with multifocal liver adenocarcinoma with solid mass in carlos hepatis and mass effect on portal vein and common bile duct and currently gets chemo every Friday, has not missed any recent appointments; did have liver mass embolization in 01/2025.  Patient endorsed that for the past few days he has had some runny nose and congestion and coughing.  He endorses sharp chest pain that is nonradiating that occurs with coughing and inspiration.  He endorses that his shortness of breath is progressively worsened to the point of where he is short of breath at rest.  He endorses weakness and fatigue.  No abdominal distention from his baseline.  No dysuria or hematuria.  He does have chills and feels feverish.  Patient endorses that he is willing to get intubated if needed and wants resuscitative measures.    Nursing Notes were reviewed.    REVIEW OF SYSTEMS       Review of Systems   Constitutional:  Positive for activity change, appetite change, chills and fatigue. Negative for diaphoresis and fever.   HENT:  Positive for congestion, postnasal drip and rhinorrhea. Negative for sinus pain.    Eyes:  Negative for pain, discharge, redness and itching.   Respiratory:  Positive for cough and shortness of breath. Negative for chest tightness.    Cardiovascular:  Positive for chest pain. Negative for

## 2025-02-09 NOTE — ED TRIAGE NOTES
Pt presents to ER with SOB that started a few days ago as well as feeling under the weather. Pt does not have a history of Asthma or COPD but has been feeling more SOB on exertion. Per this RN patient was roomed due to pulse ox being 89% on room air and audible wheezing heard on expiration. Pt is A&ox4, warm and dry but pale at this time. All other vitals are stable.

## 2025-02-10 PROBLEM — J10.1 INFLUENZA A: Status: ACTIVE | Noted: 2025-02-10

## 2025-02-10 PROBLEM — R09.02 HYPOXEMIA: Status: ACTIVE | Noted: 2025-02-10

## 2025-02-10 PROBLEM — J47.9 BRONCHIECTASIS WITHOUT COMPLICATION (HCC): Status: ACTIVE | Noted: 2025-02-10

## 2025-02-10 LAB
A BAUMANNII DNA BLD POS QL NAA+NON-PROBE: NOT DETECTED
ALBUMIN SERPL-MCNC: 3.2 G/DL (ref 3.5–4.6)
ALP SERPL-CCNC: 79 U/L (ref 35–104)
ALT SERPL-CCNC: 35 U/L (ref 0–41)
ANION GAP SERPL CALCULATED.3IONS-SCNC: 11 MEQ/L (ref 9–15)
ANISOCYTOSIS BLD QL SMEAR: ABNORMAL
AST SERPL-CCNC: 39 U/L (ref 0–40)
BACTERIA BLD CULT: ABNORMAL
BASOPHILS # BLD: 0 K/UL (ref 0–0.2)
BASOPHILS NFR BLD: 0 %
BILIRUB SERPL-MCNC: 0.3 MG/DL (ref 0.2–0.7)
BUN SERPL-MCNC: 32 MG/DL (ref 8–23)
C ALBICANS DNA BLD POS QL NAA+NON-PROBE: NOT DETECTED
C AURIS DNA BLD POS QL NAA+PROBE: NOT DETECTED
C GLABRATA DNA BLD POS QL NAA+NON-PROBE: NOT DETECTED
C KRUSEI DNA BLD POS QL NAA+NON-PROBE: NOT DETECTED
C PARAP DNA BLD POS QL NAA+NON-PROBE: NOT DETECTED
C TROPICLS DNA BLD POS QL NAA+NON-PROBE: NOT DETECTED
CALCIUM SERPL-MCNC: 8.3 MG/DL (ref 8.5–9.9)
CHLORIDE SERPL-SCNC: 98 MEQ/L (ref 95–107)
CO2 SERPL-SCNC: 23 MEQ/L (ref 20–31)
CREAT SERPL-MCNC: 0.58 MG/DL (ref 0.7–1.2)
CRYPTOCOCCUS NEOFORMANS/GATTII BY PCR: NOT DETECTED
E CLOAC COMP DNA BLD POS NAA+NON-PROBE: NOT DETECTED
E COLI DNA BLD POS QL NAA+NON-PROBE: NOT DETECTED
E FAECALIS DNA BLD POS QL NAA+PROBE: NOT DETECTED
E FAECIUM DNA BLD POS QL NAA+PROBE: NOT DETECTED
EKG ATRIAL RATE: 110 BPM
EKG P AXIS: 91 DEGREES
EKG P-R INTERVAL: 138 MS
EKG Q-T INTERVAL: 300 MS
EKG QRS DURATION: 86 MS
EKG QTC CALCULATION (BAZETT): 406 MS
EKG R AXIS: 59 DEGREES
EKG T AXIS: 80 DEGREES
EKG VENTRICULAR RATE: 110 BPM
ENTEROBACT DNA BLD POS QL NAA+NON-PROBE: NOT DETECTED
ENTEROCOC DNA BLD POS QL NAA+NON-PROBE: NOT DETECTED
EOSINOPHIL # BLD: 0 K/UL (ref 0–0.7)
EOSINOPHIL NFR BLD: 0 %
ERYTHROCYTE [DISTWIDTH] IN BLOOD BY AUTOMATED COUNT: 13.1 % (ref 11.5–14.5)
GLOBULIN SER CALC-MCNC: 3 G/DL (ref 2.3–3.5)
GLUCOSE SERPL-MCNC: 116 MG/DL (ref 70–99)
GN BLD CULTURE PNL BLD POS NAA+PROBE: NOT DETECTED
GP B STREP DNA BLD POS QL NAA+NON-PROBE: NOT DETECTED
HCT VFR BLD AUTO: 35.4 % (ref 42–52)
HGB BLD-MCNC: 12.7 G/DL (ref 14–18)
K OXYTOCA DNA BLD POS QL NAA+NON-PROBE: NOT DETECTED
K PNEUMON DNA SPEC QL NAA+PROBE: NOT DETECTED
K. AEROGENES DNA SPEC QL NAA+PROBE: NOT DETECTED
L MONOCYTOG DNA BLD POS QL NAA+NON-PROBE: NOT DETECTED
LYMPHOCYTES # BLD: 0.2 K/UL (ref 1–4.8)
LYMPHOCYTES NFR BLD: 2 %
MAGNESIUM SERPL-MCNC: 2.2 MG/DL (ref 1.7–2.4)
MCH RBC QN AUTO: 32.2 PG (ref 27–31.3)
MCHC RBC AUTO-ENTMCNC: 35.9 % (ref 33–37)
MCV RBC AUTO: 89.6 FL (ref 79–92.2)
MICROCYTES BLD QL SMEAR: ABNORMAL
MONOCYTES # BLD: 0 K/UL (ref 0.2–0.8)
MONOCYTES NFR BLD: 3.4 %
N MEN DNA BLD POS QL NAA+NON-PROBE: NOT DETECTED
NEUTROPHILS # BLD: 2.9 K/UL (ref 1.4–6.5)
NEUTS SEG NFR BLD: 95 %
P AERUGINOSA DNA BLD POS NAA+NON-PROBE: NOT DETECTED
PATH INTERP BLD-IMP: YES
PERFORMED ON: NORMAL
PLATELET # BLD AUTO: 84 K/UL (ref 130–400)
PLATELET BLD QL SMEAR: ABNORMAL
POC CREATININE: 0.9 MG/DL (ref 0.8–1.3)
POC SAMPLE TYPE: NORMAL
POIKILOCYTOSIS BLD QL SMEAR: ABNORMAL
POTASSIUM SERPL-SCNC: 4.6 MEQ/L (ref 3.4–4.9)
PROT SERPL-MCNC: 6.2 G/DL (ref 6.3–8)
PROTEUS SP DNA BLD POS QL NAA+NON-PROBE: NOT DETECTED
RBC # BLD AUTO: 3.95 M/UL (ref 4.7–6.1)
S AUREUS DNA BLD POS QL NAA+NON-PROBE: NOT DETECTED
S AUREUS+CONS DNA BLD POS NAA+NON-PROBE: DETECTED
S EPIDERMIDIS DNA BLD POS QL NAA+PROBE: NOT DETECTED
S LUGDUNENSIS DNA BLD POS QL NAA+PROBE: NOT DETECTED
S MALTOPH DNA BLD POS QL NAA+PROBE: NOT DETECTED
S MARCESCENS DNA BLD POS NAA+NON-PROBE: NOT DETECTED
S PNEUM DNA BLD POS QL NAA+NON-PROBE: NOT DETECTED
S PYO DNA BLD POS QL NAA+NON-PROBE: NOT DETECTED
SALMONELLA DNA BLD POS QL NAA+PROBE: NOT DETECTED
SLIDE REVIEW: ABNORMAL
SMUDGE CELLS BLD QL SMEAR: 9.6
SODIUM SERPL-SCNC: 132 MEQ/L (ref 135–144)
STREPTOCOCCUS DNA BLD POS NAA+NON-PROBE: NOT DETECTED
VARIANT LYMPHS NFR BLD: 3 %
WBC # BLD AUTO: 3 K/UL (ref 4.8–10.8)

## 2025-02-10 PROCEDURE — 87040 BLOOD CULTURE FOR BACTERIA: CPT

## 2025-02-10 PROCEDURE — 2580000003 HC RX 258: Performed by: INTERNAL MEDICINE

## 2025-02-10 PROCEDURE — 2060000000 HC ICU INTERMEDIATE R&B

## 2025-02-10 PROCEDURE — 94640 AIRWAY INHALATION TREATMENT: CPT

## 2025-02-10 PROCEDURE — 99222 1ST HOSP IP/OBS MODERATE 55: CPT | Performed by: INTERNAL MEDICINE

## 2025-02-10 PROCEDURE — 6370000000 HC RX 637 (ALT 250 FOR IP): Performed by: NURSE PRACTITIONER

## 2025-02-10 PROCEDURE — 6360000002 HC RX W HCPCS

## 2025-02-10 PROCEDURE — 2500000003 HC RX 250 WO HCPCS: Performed by: NURSE PRACTITIONER

## 2025-02-10 PROCEDURE — 6370000000 HC RX 637 (ALT 250 FOR IP): Performed by: INTERNAL MEDICINE

## 2025-02-10 PROCEDURE — 5A0935A ASSISTANCE WITH RESPIRATORY VENTILATION, LESS THAN 24 CONSECUTIVE HOURS, HIGH NASAL FLOW/VELOCITY: ICD-10-PCS | Performed by: INTERNAL MEDICINE

## 2025-02-10 PROCEDURE — 85025 COMPLETE CBC W/AUTO DIFF WBC: CPT

## 2025-02-10 PROCEDURE — 94761 N-INVAS EAR/PLS OXIMETRY MLT: CPT

## 2025-02-10 PROCEDURE — 6360000002 HC RX W HCPCS: Performed by: NURSE PRACTITIONER

## 2025-02-10 PROCEDURE — 80053 COMPREHEN METABOLIC PANEL: CPT

## 2025-02-10 PROCEDURE — 6360000002 HC RX W HCPCS: Performed by: INTERNAL MEDICINE

## 2025-02-10 PROCEDURE — 2700000000 HC OXYGEN THERAPY PER DAY

## 2025-02-10 PROCEDURE — 86403 PARTICLE AGGLUT ANTBDY SCRN: CPT

## 2025-02-10 PROCEDURE — 94660 CPAP INITIATION&MGMT: CPT

## 2025-02-10 PROCEDURE — 83735 ASSAY OF MAGNESIUM: CPT

## 2025-02-10 PROCEDURE — 36415 COLL VENOUS BLD VENIPUNCTURE: CPT

## 2025-02-10 PROCEDURE — 2500000003 HC RX 250 WO HCPCS: Performed by: INTERNAL MEDICINE

## 2025-02-10 PROCEDURE — 99223 1ST HOSP IP/OBS HIGH 75: CPT | Performed by: INTERNAL MEDICINE

## 2025-02-10 RX ORDER — VELPATASVIR AND SOFOSBUVIR 100; 400 MG/1; MG/1
1 TABLET, FILM COATED ORAL DAILY
Status: DISCONTINUED | OUTPATIENT
Start: 2025-02-10 | End: 2025-02-13 | Stop reason: HOSPADM

## 2025-02-10 RX ORDER — AMLODIPINE BESYLATE 5 MG/1
5 TABLET ORAL DAILY
Status: DISCONTINUED | OUTPATIENT
Start: 2025-02-10 | End: 2025-02-13 | Stop reason: HOSPADM

## 2025-02-10 RX ORDER — LORAZEPAM 2 MG/ML
0.5 INJECTION INTRAMUSCULAR ONCE
Status: COMPLETED | OUTPATIENT
Start: 2025-02-10 | End: 2025-02-10

## 2025-02-10 RX ORDER — IPRATROPIUM BROMIDE AND ALBUTEROL SULFATE 2.5; .5 MG/3ML; MG/3ML
1 SOLUTION RESPIRATORY (INHALATION)
Status: DISCONTINUED | OUTPATIENT
Start: 2025-02-10 | End: 2025-02-13

## 2025-02-10 RX ORDER — AMITRIPTYLINE HYDROCHLORIDE 50 MG/1
100 TABLET ORAL NIGHTLY
Status: DISCONTINUED | OUTPATIENT
Start: 2025-02-10 | End: 2025-02-13 | Stop reason: HOSPADM

## 2025-02-10 RX ORDER — TRAMADOL HYDROCHLORIDE 50 MG/1
50 TABLET ORAL EVERY 6 HOURS PRN
Status: DISCONTINUED | OUTPATIENT
Start: 2025-02-10 | End: 2025-02-13 | Stop reason: HOSPADM

## 2025-02-10 RX ADMIN — OSELTAMIVIR PHOSPHATE 75 MG: 75 CAPSULE ORAL at 08:22

## 2025-02-10 RX ADMIN — AMITRIPTYLINE HYDROCHLORIDE 100 MG: 100 TABLET, FILM COATED ORAL at 21:37

## 2025-02-10 RX ADMIN — SODIUM CHLORIDE 1500 MG: 9 INJECTION, SOLUTION INTRAVENOUS at 18:50

## 2025-02-10 RX ADMIN — SODIUM CHLORIDE, PRESERVATIVE FREE 10 ML: 5 INJECTION INTRAVENOUS at 08:20

## 2025-02-10 RX ADMIN — SODIUM CHLORIDE, PRESERVATIVE FREE 10 ML: 5 INJECTION INTRAVENOUS at 21:37

## 2025-02-10 RX ADMIN — TRAMADOL HYDROCHLORIDE 50 MG: 50 TABLET, COATED ORAL at 17:53

## 2025-02-10 RX ADMIN — IPRATROPIUM BROMIDE AND ALBUTEROL SULFATE 1 DOSE: 2.5; .5 SOLUTION RESPIRATORY (INHALATION) at 07:30

## 2025-02-10 RX ADMIN — METHYLPREDNISOLONE SODIUM SUCCINATE 40 MG: 40 INJECTION INTRAMUSCULAR; INTRAVENOUS at 01:27

## 2025-02-10 RX ADMIN — VELPATASVIR AND SOFOSBUVIR 1 TABLET: 100; 400 TABLET, FILM COATED ORAL at 14:30

## 2025-02-10 RX ADMIN — METHYLPREDNISOLONE SODIUM SUCCINATE 40 MG: 40 INJECTION, POWDER, LYOPHILIZED, FOR SOLUTION INTRAMUSCULAR; INTRAVENOUS at 21:37

## 2025-02-10 RX ADMIN — OSELTAMIVIR PHOSPHATE 75 MG: 75 CAPSULE ORAL at 22:37

## 2025-02-10 RX ADMIN — Medication 0.5 MG: at 03:10

## 2025-02-10 RX ADMIN — METHYLPREDNISOLONE SODIUM SUCCINATE 40 MG: 40 INJECTION INTRAMUSCULAR; INTRAVENOUS at 08:20

## 2025-02-10 RX ADMIN — AMLODIPINE BESYLATE 5 MG: 5 TABLET ORAL at 14:30

## 2025-02-10 RX ADMIN — IPRATROPIUM BROMIDE AND ALBUTEROL SULFATE 1 DOSE: 2.5; .5 SOLUTION RESPIRATORY (INHALATION) at 20:45

## 2025-02-10 RX ADMIN — IPRATROPIUM BROMIDE AND ALBUTEROL SULFATE 1 DOSE: 2.5; .5 SOLUTION RESPIRATORY (INHALATION) at 14:03

## 2025-02-10 RX ADMIN — Medication 0.5 MG: at 22:55

## 2025-02-10 ASSESSMENT — PAIN DESCRIPTION - LOCATION
LOCATION: BACK;ABDOMEN
LOCATION: ABDOMEN

## 2025-02-10 ASSESSMENT — ENCOUNTER SYMPTOMS
EYES NEGATIVE: 1
SHORTNESS OF BREATH: 1
COUGH: 1
GASTROINTESTINAL NEGATIVE: 1

## 2025-02-10 ASSESSMENT — PAIN SCALES - GENERAL
PAINLEVEL_OUTOF10: 5
PAINLEVEL_OUTOF10: 2
PAINLEVEL_OUTOF10: 6

## 2025-02-10 ASSESSMENT — PAIN DESCRIPTION - ORIENTATION: ORIENTATION: RIGHT

## 2025-02-10 ASSESSMENT — PAIN DESCRIPTION - DESCRIPTORS: DESCRIPTORS: DULL

## 2025-02-10 NOTE — CARE COORDINATION
Yes  Other Identified Issues/Barriers to RETURNING to current housing: MEDICAL CLEARANCE   Potential Assistance needed at discharge: N/A            Potential DME:    Patient expects to discharge to: House  Plan for transportation at discharge:      Financial    Payor: [employer]HA [employer]ANTES / Plan: [place] / Product Type: *No Product type* /     Does insurance require precert for SNF: Yes    Potential assistance Purchasing Medications: No  Meds-to-Beds request: No      [facility] - [place], [place] - [address] - P [phone] - F [phone]  [address] [facility] [address]  Phone: [phone] Fax: [phone]    [facility] - [place], [place] - [address] [facility], [address] - P [phone] - F [phone]  [address] [place] [address], [address]  [facility] [address]  Phone: [phone] Fax: [phone]    [address] [phone] - F [phone]  [address] [place] [address]  Phone: [phone] Fax: [phone]      Notes:    Factors facilitating achievement of predicted outcomes: Family support, Motivated, Cooperative, Pleasant, Sense of humor, and Good insight into deficits    Barriers to discharge: Medical complications    Additional Case Management Notes: LSW MET WITH PT AT BEDSIDE. PT LIVES AT HOME ALONE; INDEPENDENT AT BASELINE. NO HD; NOT ; NO HD; NOT ; NO PIROR SERVICES. NO HOME O2.   DISCUSSED DC PLAN WITH PT. PT AGREED WITH DC PLAN HOME. DENIES ANY NEEDS.     The Plan for Transition of Care is related to the following treatment goals of Hypoxemia [R09.02]  Thrombocytopenia (HCC) [D69.6]  Bronchitis [J40]  Influenza A [J10.1]  Acute respiratory failure with hypoxia [J96.01]  Bronchiectasis without complication (HCC) [J47.9]    IF APPLICABLE: The Patient and/or patient representative Fahad and his family were provided with a choice of provider and agrees with the discharge plan. Freedom of choice list

## 2025-02-10 NOTE — H&P
DEPARTMENT OF HOSPITAL MEDICINE    HISTORY AND PHYSICAL EXAM    PATIENT NAME:  Fahad Mathias    MRN:  [id number]  SERVICE DATE:  2/9/2025   SERVICE TIME:  9:34 PM    Primary Care Physician: Vikas Pineda PA         SUBJECTIVE  CHIEF COMPLAINT:  Shortness of Breath       HPI: Patient being admitted for respiratory failure with hypoxia 2/2 influenza A.  Patient is a pleasant, alert and oriented x 3, 64-year-old  male.  Patient states that for the past 2 to 3 days he has been increasingly short of breath and has a cough that feels like he should be able to produce sputum but does not seem to be able to get anything up.  Patient reports minimal rhinorrhea.  Patient denies any chest pain, abdominal pain, nausea, vomiting, or diarrhea.  Patient currently on BiPAP and when trialed off of BiPAP has increased work of breathing.  Patient also hypoxic and not dependent on oxygen at baseline.    Patient's past medical history HTN, hep C (on epclusa, recent HCV non-detectable from 1/2025) with multifocal liver adenocarcinoma with solid mass in carlos hepatis and mass effect on portal vein and common bile duct and currently gets chemo every Friday, has not missed any recent appointments; did have liver mass embolization in 01/2025.  Patient recently quit smoking tobacco but occasionally smokes marijuana.  Patient denies any alcohol or other illicit drug use    PAST MEDICAL HISTORY:    Past Medical History:   Diagnosis Date    Back pain     Hypertension      PAST SURGICAL HISTORY:    Past Surgical History:   Procedure Laterality Date    COLONOSCOPY N/A 09/27/2024    COLONOSCOPY DIAGNOSTIC performed by Alejandro Meredith MD at [facility] CENTER    CT NEEDLE BIOPSY LIVER PERCUTANEOUS  12/18/2024    CT NEEDLE BIOPSY LIVER PERCUTANEOUS [place] [facility] [place]    IR PORT PLACEMENT > 5 YEARS  1/10/2025    IR PORT PLACEMENT > 5 YEARS 1/10/2025 [facility] [place]    OTHER SURGICAL HISTORY  12/18/2024    CT guided biopsy and

## 2025-02-10 NOTE — FLOWSHEET NOTE
Am nursing  assessment completed.    Pt :  awake and resting in bed             Alert and oriented.      Diet: Reg after Bipap off  Code Status:  fc      Oxygen: 6l Bubbler  Complaints of:    SOB                     Pain:  IV:   sl           patent/ flushed/ capped, no signs of infiltration noted, dressing clean/dry/intact.  TELE: SR91                Dressings:                           Precautions: droplet             Falls:  35      Maurilio: 20  Chart and meds reviewed.           Noted Labs:na 132 k 4.6 bun 35 cr0.58 w 3 h 12.7/35.4   Plan for today:    Bed wheels locked and in lowest position. Call light and bedside table within reach.   NOTES: Bipap off per request. 6L bubbler. Electronically signed by Sapphire Alcantar RN on 2/10/2025 at 9:16 AM    Dr Esa villegas for rounds. Secure message sent to primary requesting pain meds for back pain. Electronically signed by Sapphire Alcantar RN on 2/10/2025 at 4:50 PM    1755 prn ultram for complaints of pain. Lab here to draw blood cultures. Electronically signed by Sapphire Alcantar RN on 2/10/2025 at 5:55 PM

## 2025-02-10 NOTE — RT PROTOCOL NOTE
hours PRN for wheezing or increased work of breathing using Per Protocol order mode.        4-6 - enter or revise RT Bronchodilator order(s) to two equivalent RT bronchodilator orders with one order with BID Frequency and one order with Frequency of every 4 hours PRN wheezing or increased work of breathing using Per Protocol order mode.        7-10 - enter or revise RT Bronchodilator order(s) to two equivalent RT bronchodilator orders with one order with TID Frequency and one order with Frequency of every 4 hours PRN wheezing or increased work of breathing using Per Protocol order mode.       11-13 - enter or revise RT Bronchodilator order(s) to one equivalent RT bronchodilator order with QID Frequency and an Albuterol order with Frequency of every 4 hours PRN wheezing or increased work of breathing using Per Protocol order mode.      Greater than 13 - enter or revise RT Bronchodilator order(s) to one equivalent RT bronchodilator order with every 4 hours Frequency and an Albuterol order with Frequency of every 2 hours PRN wheezing or increased work of breathing using Per Protocol order mode.     RT to enter RT Home Evaluation for COPD & MDI Assessment order using Per Protocol order mode.    Electronically signed by Alysia Nguyen RCP on 2/10/2025 at 7:57 AM

## 2025-02-10 NOTE — ACP (ADVANCE CARE PLANNING)
Advance Care Planning   Healthcare Decision Maker:    Primary Decision Maker: salazar yuen - Brother/Sister - [phone]    Click here to complete Healthcare Decision Makers including selection of the Healthcare Decision Maker Relationship (ie \"Primary\").  Today we documented Decision Maker(s) consistent with Legal Next of Kin hierarchy.       Electronically signed by TRI Lilly on 2/10/2025 at 2:26 PM

## 2025-02-11 PROBLEM — B95.8 BACTEREMIA DUE TO STAPHYLOCOCCUS: Status: ACTIVE | Noted: 2025-02-11

## 2025-02-11 PROBLEM — R78.81 BACTEREMIA DUE TO STAPHYLOCOCCUS: Status: ACTIVE | Noted: 2025-02-11

## 2025-02-11 LAB
ALBUMIN SERPL-MCNC: 3.1 G/DL (ref 3.5–4.6)
ANION GAP SERPL CALCULATED.3IONS-SCNC: 7 MEQ/L (ref 9–15)
BASOPHILS # BLD: 0 K/UL (ref 0–0.2)
BASOPHILS NFR BLD: 0 %
BUN SERPL-MCNC: 25 MG/DL (ref 8–23)
CALCIUM SERPL-MCNC: 8.4 MG/DL (ref 8.5–9.9)
CHLORIDE SERPL-SCNC: 103 MEQ/L (ref 95–107)
CO2 SERPL-SCNC: 27 MEQ/L (ref 20–31)
CREAT SERPL-MCNC: 0.56 MG/DL (ref 0.7–1.2)
CRP SERPL HS-MCNC: 34.7 MG/L (ref 0–5)
EOSINOPHIL # BLD: 0 K/UL (ref 0–0.7)
EOSINOPHIL NFR BLD: 0 %
ERYTHROCYTE [DISTWIDTH] IN BLOOD BY AUTOMATED COUNT: 13.5 % (ref 11.5–14.5)
GLUCOSE SERPL-MCNC: 125 MG/DL (ref 70–99)
HCT VFR BLD AUTO: 35.3 % (ref 42–52)
HGB BLD-MCNC: 12.1 G/DL (ref 14–18)
LYMPHOCYTES # BLD: 0.7 K/UL (ref 1–4.8)
LYMPHOCYTES NFR BLD: 13.3 %
MAGNESIUM SERPL-MCNC: 2.3 MG/DL (ref 1.7–2.4)
MCH RBC QN AUTO: 31.1 PG (ref 27–31.3)
MCHC RBC AUTO-ENTMCNC: 34.3 % (ref 33–37)
MCV RBC AUTO: 90.7 FL (ref 79–92.2)
MONOCYTES # BLD: 0.5 K/UL (ref 0.2–0.8)
MONOCYTES NFR BLD: 9.7 %
NEUTROPHILS # BLD: 3.9 K/UL (ref 1.4–6.5)
NEUTS SEG NFR BLD: 76.4 %
PATH INTERP BLD-IMP: NORMAL
PHOSPHATE SERPL-MCNC: 2.4 MG/DL (ref 2.3–4.8)
PLATELET # BLD AUTO: 163 K/UL (ref 130–400)
POTASSIUM SERPL-SCNC: 4.6 MEQ/L (ref 3.4–4.9)
PROCALCITONIN SERPL IA-MCNC: 0.21 NG/ML (ref 0–0.15)
RBC # BLD AUTO: 3.89 M/UL (ref 4.7–6.1)
SODIUM SERPL-SCNC: 137 MEQ/L (ref 135–144)
WBC # BLD AUTO: 5.1 K/UL (ref 4.8–10.8)

## 2025-02-11 PROCEDURE — 99232 SBSQ HOSP IP/OBS MODERATE 35: CPT | Performed by: INTERNAL MEDICINE

## 2025-02-11 PROCEDURE — 94761 N-INVAS EAR/PLS OXIMETRY MLT: CPT

## 2025-02-11 PROCEDURE — 6370000000 HC RX 637 (ALT 250 FOR IP): Performed by: INTERNAL MEDICINE

## 2025-02-11 PROCEDURE — 83735 ASSAY OF MAGNESIUM: CPT

## 2025-02-11 PROCEDURE — 85025 COMPLETE CBC W/AUTO DIFF WBC: CPT

## 2025-02-11 PROCEDURE — 2060000000 HC ICU INTERMEDIATE R&B

## 2025-02-11 PROCEDURE — 36415 COLL VENOUS BLD VENIPUNCTURE: CPT

## 2025-02-11 PROCEDURE — 6360000002 HC RX W HCPCS: Performed by: INTERNAL MEDICINE

## 2025-02-11 PROCEDURE — 2580000003 HC RX 258: Performed by: INTERNAL MEDICINE

## 2025-02-11 PROCEDURE — 97161 PT EVAL LOW COMPLEX 20 MIN: CPT

## 2025-02-11 PROCEDURE — 2500000003 HC RX 250 WO HCPCS: Performed by: INTERNAL MEDICINE

## 2025-02-11 PROCEDURE — 94640 AIRWAY INHALATION TREATMENT: CPT

## 2025-02-11 PROCEDURE — 84145 PROCALCITONIN (PCT): CPT

## 2025-02-11 PROCEDURE — 94660 CPAP INITIATION&MGMT: CPT

## 2025-02-11 PROCEDURE — 2500000003 HC RX 250 WO HCPCS: Performed by: NURSE PRACTITIONER

## 2025-02-11 PROCEDURE — 6360000002 HC RX W HCPCS

## 2025-02-11 PROCEDURE — 97166 OT EVAL MOD COMPLEX 45 MIN: CPT

## 2025-02-11 PROCEDURE — 86140 C-REACTIVE PROTEIN: CPT

## 2025-02-11 PROCEDURE — 6370000000 HC RX 637 (ALT 250 FOR IP): Performed by: NURSE PRACTITIONER

## 2025-02-11 PROCEDURE — 80069 RENAL FUNCTION PANEL: CPT

## 2025-02-11 RX ORDER — LORAZEPAM 2 MG/ML
0.5 INJECTION INTRAMUSCULAR ONCE
Status: COMPLETED | OUTPATIENT
Start: 2025-02-11 | End: 2025-02-11

## 2025-02-11 RX ORDER — VANCOMYCIN 1.75 G/350ML
1250 INJECTION, SOLUTION INTRAVENOUS EVERY 12 HOURS
Status: DISCONTINUED | OUTPATIENT
Start: 2025-02-11 | End: 2025-02-12

## 2025-02-11 RX ADMIN — Medication 0.5 MG: at 23:13

## 2025-02-11 RX ADMIN — VANCOMYCIN 1250 MG: 1.75 INJECTION, SOLUTION INTRAVENOUS at 17:39

## 2025-02-11 RX ADMIN — AMITRIPTYLINE HYDROCHLORIDE 100 MG: 100 TABLET, FILM COATED ORAL at 20:59

## 2025-02-11 RX ADMIN — SODIUM CHLORIDE, PRESERVATIVE FREE 10 ML: 5 INJECTION INTRAVENOUS at 19:51

## 2025-02-11 RX ADMIN — AMLODIPINE BESYLATE 5 MG: 5 TABLET ORAL at 08:28

## 2025-02-11 RX ADMIN — SODIUM CHLORIDE, PRESERVATIVE FREE 10 ML: 5 INJECTION INTRAVENOUS at 08:31

## 2025-02-11 RX ADMIN — OSELTAMIVIR PHOSPHATE 75 MG: 75 CAPSULE ORAL at 08:29

## 2025-02-11 RX ADMIN — VELPATASVIR AND SOFOSBUVIR 1 TABLET: 100; 400 TABLET, FILM COATED ORAL at 08:29

## 2025-02-11 RX ADMIN — IPRATROPIUM BROMIDE AND ALBUTEROL SULFATE 1 DOSE: 2.5; .5 SOLUTION RESPIRATORY (INHALATION) at 07:20

## 2025-02-11 RX ADMIN — TRAMADOL HYDROCHLORIDE 50 MG: 50 TABLET, COATED ORAL at 19:51

## 2025-02-11 RX ADMIN — OSELTAMIVIR PHOSPHATE 75 MG: 75 CAPSULE ORAL at 20:59

## 2025-02-11 RX ADMIN — IPRATROPIUM BROMIDE AND ALBUTEROL SULFATE 1 DOSE: 2.5; .5 SOLUTION RESPIRATORY (INHALATION) at 20:43

## 2025-02-11 RX ADMIN — VANCOMYCIN HYDROCHLORIDE 1000 MG: 1 INJECTION, POWDER, LYOPHILIZED, FOR SOLUTION INTRAVENOUS at 06:09

## 2025-02-11 RX ADMIN — METHYLPREDNISOLONE SODIUM SUCCINATE 40 MG: 40 INJECTION, POWDER, LYOPHILIZED, FOR SOLUTION INTRAMUSCULAR; INTRAVENOUS at 08:29

## 2025-02-11 RX ADMIN — METHYLPREDNISOLONE SODIUM SUCCINATE 40 MG: 40 INJECTION, POWDER, LYOPHILIZED, FOR SOLUTION INTRAMUSCULAR; INTRAVENOUS at 19:50

## 2025-02-11 RX ADMIN — IPRATROPIUM BROMIDE AND ALBUTEROL SULFATE 1 DOSE: 2.5; .5 SOLUTION RESPIRATORY (INHALATION) at 13:16

## 2025-02-11 ASSESSMENT — PAIN DESCRIPTION - ORIENTATION: ORIENTATION: LOWER

## 2025-02-11 ASSESSMENT — PAIN DESCRIPTION - DESCRIPTORS: DESCRIPTORS: ACHING;SORE

## 2025-02-11 ASSESSMENT — PAIN SCALES - GENERAL
PAINLEVEL_OUTOF10: 7
PAINLEVEL_OUTOF10: 0
PAINLEVEL_OUTOF10: 3

## 2025-02-11 ASSESSMENT — PAIN DESCRIPTION - LOCATION: LOCATION: BACK

## 2025-02-11 ASSESSMENT — ENCOUNTER SYMPTOMS
SHORTNESS OF BREATH: 1
GASTROINTESTINAL NEGATIVE: 1
COUGH: 1

## 2025-02-11 NOTE — CARE COORDINATION
Pt alert and oriented x4. Pt would like to return home at DC. Pt on 4 L O2 at this time. No Home O2 at home.  Monitor for possible O2 eval.   PT/OT eval?    Electronically signed by TRI Lilly on 2/11/2025 at 11:44 AM

## 2025-02-11 NOTE — ACP (ADVANCE CARE PLANNING)
Advance Care Planning     Advance Care Planning Inpatient Note  [facility] Department    Today's Date: 2/11/2025  Unit: [facility]    Received request from Other: RN .  Upon review of chart and communication with care team, patient's decision making abilities are not in question.. Patient was/were present in the room during visit.    Goals of ACP Conversation:  Discuss advance care planning documents    Health Care Decision Makers:       Primary Decision Maker: Han Mathias - Brother/Sister - [phone]  Summary:  Completed New Documents     reports, Conducted Advance Care Directive with patient. Patient alert, aware, attentive. Patient receptive and responsive to  directives and explanation of HPOA.      reports, patient self-awareness and orientation in decision making in not in question at time of completion of HPOA forms. Patient present and eager to complete HPOA.      reports, Advance Care Directive Consult Completed and original copy of HPOA returned patient. Copy of HPOA placed in patients blue medical folder.     Advance Care Planning Documents (Patient Wishes):  Healthcare Power of /Advance Directive Appointment of Health Care Agent     Assessment:      Interventions:  Assisted in the completion of documents according to patient's wishes at this time    Care Preferences Communicated:   No    Outcomes/Plan:  New advance directive completed.    Electronically signed by Chaplain Gatito on 2/11/2025 at 3:30 PM

## 2025-02-12 PROBLEM — Z51.5 PALLIATIVE CARE ENCOUNTER: Status: ACTIVE | Noted: 2025-02-12

## 2025-02-12 PROBLEM — Z71.89 GOALS OF CARE, COUNSELING/DISCUSSION: Status: ACTIVE | Noted: 2025-02-12

## 2025-02-12 PROBLEM — E44.0 MODERATE MALNUTRITION (HCC): Status: ACTIVE | Noted: 2025-02-12

## 2025-02-12 PROBLEM — Z78.9 FULL CODE STATUS: Status: ACTIVE | Noted: 2025-02-12

## 2025-02-12 PROBLEM — Z71.89 ADVANCED CARE PLANNING/COUNSELING DISCUSSION: Status: ACTIVE | Noted: 2025-02-12

## 2025-02-12 PROBLEM — L85.3 DRY SKIN: Status: ACTIVE | Noted: 2025-02-12

## 2025-02-12 LAB
ALBUMIN SERPL-MCNC: 3.1 G/DL (ref 3.5–4.6)
ANION GAP SERPL CALCULATED.3IONS-SCNC: 5 MEQ/L (ref 9–15)
ANISOCYTOSIS BLD QL SMEAR: ABNORMAL
BASOPHILS # BLD: 0 K/UL (ref 0–0.2)
BASOPHILS NFR BLD: 0.2 %
BUN SERPL-MCNC: 23 MG/DL (ref 8–23)
CALCIUM SERPL-MCNC: 8.5 MG/DL (ref 8.5–9.9)
CHLORIDE SERPL-SCNC: 101 MEQ/L (ref 95–107)
CO2 SERPL-SCNC: 29 MEQ/L (ref 20–31)
CREAT SERPL-MCNC: 0.52 MG/DL (ref 0.7–1.2)
CULTURE, BLOOD ID SENSITIVITY: ABNORMAL
CULTURE, BLOOD ID SENSITIVITY: ABNORMAL
EOSINOPHIL # BLD: 0 K/UL (ref 0–0.7)
EOSINOPHIL NFR BLD: 0 %
ERYTHROCYTE [DISTWIDTH] IN BLOOD BY AUTOMATED COUNT: 13.2 % (ref 11.5–14.5)
GLUCOSE SERPL-MCNC: 122 MG/DL (ref 70–99)
HCT VFR BLD AUTO: 34.4 % (ref 42–52)
HGB BLD-MCNC: 11.8 G/DL (ref 14–18)
HYPOCHROMIA BLD QL SMEAR: ABNORMAL
LYMPHOCYTES # BLD: 0.4 K/UL (ref 1–4.8)
LYMPHOCYTES NFR BLD: 7 %
MAGNESIUM SERPL-MCNC: 2.2 MG/DL (ref 1.7–2.4)
MCH RBC QN AUTO: 31.3 PG (ref 27–31.3)
MCHC RBC AUTO-ENTMCNC: 34.3 % (ref 33–37)
MCV RBC AUTO: 91.2 FL (ref 79–92.2)
MICROCYTES BLD QL SMEAR: ABNORMAL
MONOCYTES # BLD: 0.5 K/UL (ref 0.2–0.8)
MONOCYTES NFR BLD: 11.4 %
MYELOCYTES NFR BLD MANUAL: 1 %
NEUTROPHILS # BLD: 3.9 K/UL (ref 1.4–6.5)
NEUTS BAND NFR BLD MANUAL: 1 %
NEUTS SEG NFR BLD: 78 %
ORGANISM: ABNORMAL
OVALOCYTES BLD QL SMEAR: ABNORMAL
PHOSPHATE SERPL-MCNC: 2.8 MG/DL (ref 2.3–4.8)
PLATELET # BLD AUTO: 237 K/UL (ref 130–400)
PLATELET BLD QL SMEAR: ADEQUATE
POIKILOCYTOSIS BLD QL SMEAR: ABNORMAL
POTASSIUM SERPL-SCNC: 4.6 MEQ/L (ref 3.4–4.9)
RBC # BLD AUTO: 3.77 M/UL (ref 4.7–6.1)
SLIDE REVIEW: ABNORMAL
SMUDGE CELLS BLD QL SMEAR: 10.5
SODIUM SERPL-SCNC: 135 MEQ/L (ref 135–144)
VARIANT LYMPHS NFR BLD: 2 %
WBC # BLD AUTO: 4.9 K/UL (ref 4.8–10.8)

## 2025-02-12 PROCEDURE — 99232 SBSQ HOSP IP/OBS MODERATE 35: CPT | Performed by: INTERNAL MEDICINE

## 2025-02-12 PROCEDURE — 2700000000 HC OXYGEN THERAPY PER DAY

## 2025-02-12 PROCEDURE — 6370000000 HC RX 637 (ALT 250 FOR IP): Performed by: INTERNAL MEDICINE

## 2025-02-12 PROCEDURE — 97535 SELF CARE MNGMENT TRAINING: CPT

## 2025-02-12 PROCEDURE — 2500000003 HC RX 250 WO HCPCS: Performed by: NURSE PRACTITIONER

## 2025-02-12 PROCEDURE — 36415 COLL VENOUS BLD VENIPUNCTURE: CPT

## 2025-02-12 PROCEDURE — 85025 COMPLETE CBC W/AUTO DIFF WBC: CPT

## 2025-02-12 PROCEDURE — 83735 ASSAY OF MAGNESIUM: CPT

## 2025-02-12 PROCEDURE — 6360000002 HC RX W HCPCS: Performed by: INTERNAL MEDICINE

## 2025-02-12 PROCEDURE — 1210000000 HC MED SURG R&B

## 2025-02-12 PROCEDURE — 2500000003 HC RX 250 WO HCPCS: Performed by: INTERNAL MEDICINE

## 2025-02-12 PROCEDURE — 99222 1ST HOSP IP/OBS MODERATE 55: CPT

## 2025-02-12 PROCEDURE — 6370000000 HC RX 637 (ALT 250 FOR IP): Performed by: NURSE PRACTITIONER

## 2025-02-12 PROCEDURE — 94640 AIRWAY INHALATION TREATMENT: CPT

## 2025-02-12 PROCEDURE — 97116 GAIT TRAINING THERAPY: CPT

## 2025-02-12 PROCEDURE — 94761 N-INVAS EAR/PLS OXIMETRY MLT: CPT

## 2025-02-12 PROCEDURE — 80069 RENAL FUNCTION PANEL: CPT

## 2025-02-12 RX ADMIN — IPRATROPIUM BROMIDE AND ALBUTEROL SULFATE 1 DOSE: 2.5; .5 SOLUTION RESPIRATORY (INHALATION) at 19:27

## 2025-02-12 RX ADMIN — AMITRIPTYLINE HYDROCHLORIDE 100 MG: 100 TABLET, FILM COATED ORAL at 21:52

## 2025-02-12 RX ADMIN — OSELTAMIVIR PHOSPHATE 75 MG: 75 CAPSULE ORAL at 08:21

## 2025-02-12 RX ADMIN — TRAMADOL HYDROCHLORIDE 50 MG: 50 TABLET, COATED ORAL at 13:09

## 2025-02-12 RX ADMIN — IPRATROPIUM BROMIDE AND ALBUTEROL SULFATE 1 DOSE: 2.5; .5 SOLUTION RESPIRATORY (INHALATION) at 12:51

## 2025-02-12 RX ADMIN — METHYLPREDNISOLONE SODIUM SUCCINATE 40 MG: 40 INJECTION, POWDER, LYOPHILIZED, FOR SOLUTION INTRAMUSCULAR; INTRAVENOUS at 08:21

## 2025-02-12 RX ADMIN — IPRATROPIUM BROMIDE AND ALBUTEROL SULFATE 1 DOSE: 2.5; .5 SOLUTION RESPIRATORY (INHALATION) at 07:13

## 2025-02-12 RX ADMIN — AMLODIPINE BESYLATE 5 MG: 5 TABLET ORAL at 08:20

## 2025-02-12 RX ADMIN — VANCOMYCIN 1250 MG: 1.75 INJECTION, SOLUTION INTRAVENOUS at 06:24

## 2025-02-12 RX ADMIN — OSELTAMIVIR PHOSPHATE 75 MG: 75 CAPSULE ORAL at 21:52

## 2025-02-12 RX ADMIN — SODIUM CHLORIDE, PRESERVATIVE FREE 10 ML: 5 INJECTION INTRAVENOUS at 08:27

## 2025-02-12 RX ADMIN — VELPATASVIR AND SOFOSBUVIR 1 TABLET: 100; 400 TABLET, FILM COATED ORAL at 08:22

## 2025-02-12 RX ADMIN — METHYLPREDNISOLONE SODIUM SUCCINATE 40 MG: 40 INJECTION, POWDER, LYOPHILIZED, FOR SOLUTION INTRAMUSCULAR; INTRAVENOUS at 21:56

## 2025-02-12 RX ADMIN — SODIUM CHLORIDE, PRESERVATIVE FREE 10 ML: 5 INJECTION INTRAVENOUS at 21:56

## 2025-02-12 RX ADMIN — TRAMADOL HYDROCHLORIDE 50 MG: 50 TABLET, COATED ORAL at 22:01

## 2025-02-12 ASSESSMENT — ENCOUNTER SYMPTOMS
SHORTNESS OF BREATH: 0
GASTROINTESTINAL NEGATIVE: 1
COUGH: 0

## 2025-02-12 ASSESSMENT — PAIN DESCRIPTION - DESCRIPTORS: DESCRIPTORS: ACHING

## 2025-02-12 ASSESSMENT — PAIN DESCRIPTION - ORIENTATION: ORIENTATION: LOWER

## 2025-02-12 ASSESSMENT — PAIN SCALES - GENERAL
PAINLEVEL_OUTOF10: 0
PAINLEVEL_OUTOF10: 5
PAINLEVEL_OUTOF10: 4

## 2025-02-12 ASSESSMENT — PAIN DESCRIPTION - LOCATION
LOCATION: BACK
LOCATION: BACK

## 2025-02-12 ASSESSMENT — VISUAL ACUITY: OU: 1

## 2025-02-12 NOTE — CONSULTS
Infectious Disease     Patient Name: Fahad Mathias  Date: 2/10/2025  YOB: 1960  Medical Record Number: [id number]      Staphylococcus bacteremia  Influenza A      History of Present Illness:  Hypertension hepatitis C liver carcinoma on chemotherapy  Had liver mass embolization January 2025    Patient has Ibipzj-h-Csad        Patient presents with shortness of breath hypoxia found to be influenza A positive  L for 2 to 3 days increasing shortness of breath cough sputum production  Admitted required to be placed on BiPAP        CTA CHEST W WO CONTRAST [[id number]]  Status: Final result     PACS Images     Show images for CTA CHEST W WO CONTRAST  CTA CHEST W WO CONTRAST  Order: [id number]  Status: Final result       Visible to patient: Yes (not seen)       Next appt: 02/17/2025 at 03:00 PM in Radiology (Daja Soria PA-C)    0 Result Notes  Details    Reading Physician Reading Date Result Priority   Singh, Kylah Sinclair MD  [phone] 2/9/2025      Narrative & Impression  EXAMINATION:  CTA OF THE CHEST WITH AND WITHOUT CONTRAST 2/9/2025 7:36 pm     TECHNIQUE:  CTA of the chest was performed before and after the administration of  intravenous contrast.  Multiplanar reformatted images are provided for  review.  MIP images are provided for review. Automated exposure control,  iterative reconstruction, and/or weight based adjustment of the mA/kV was  utilized to reduce the radiation dose to as low as reasonably achievable.     COMPARISON:  12/18/2024     HISTORY:  ORDERING SYSTEM PROVIDED HISTORY: Patient with hepatocellular carcinoma,  chemo, increased work of breathing, tachycardic and hypoxic, concern for  potential PE or pneumonitis, chest x-ray did not reveal any new findings  TECHNOLOGIST PROVIDED HISTORY:  Reason for exam:->Patient with hepatocellular carcinoma, chemo, increased  work of breathing, tachycardic and hypoxic, concern for potential PE or  pneumonitis, chest x-ray did not reveal any new 
Pulmonary Medicine  Consult Note      Reason for consultation: Hypoxia    Consulting physician: Miles Meneses RN , NP    HISTORY OF PRESENT ILLNESS:     This is 64-year-old male with past medical history significant for hypertension, hepatitis C, liver adenocarcinoma with solid mass on carlos hepatis and mass effect on portal vein and common bile duct currently on chemotherapy.  He had liver mass embolization in January 2025.  He quit smoking tobacco but smokes marijuana.   He had ABG showed pH 7.37 with pCO2 of 49 pO2 of 1 O2 saturation 28.5 and O2 saturation 99%.  He has cough and short of breath.  He has no nausea vomiting diarrhea abdominal pain.  He was on BiPAP therapy due to increased work of breathing and he was hypoxic and not dependent on oxygen at baseline.   Pulmonary was consulted due to hypoxia.  He is currently on nebulizer.  Albuterol and Atrovent, Solu-Medrol 40 mg every 12 hourly, Tamiflu 75 mg twice daily.        Past Medical History:        Diagnosis Date    Back pain     Hypertension        Past Surgical History:        Procedure Laterality Date    COLONOSCOPY N/A 09/27/2024    COLONOSCOPY DIAGNOSTIC performed by Alejandro Meredith MD at [facility]    CT NEEDLE BIOPSY LIVER PERCUTANEOUS  12/18/2024    CT NEEDLE BIOPSY LIVER PERCUTANEOUS 12/18/2024 [facility]    IR PORT PLACEMENT > 5 YEARS  1/10/2025    IR PORT PLACEMENT > 5 YEARS 1/10/2025 [facility] PROCEDURE    OTHER SURGICAL HISTORY  12/18/2024    CT guided biopsy and liver ablation with montiored anesthesia care - performed by Dr. Nicholson    TUNNELED VENOUS PORT PLACEMENT Right 01/10/2025    Bard ClearVUE Power Port 8F (LOT: [id number] / Exp: 02/28/2026) placed by Dr. Nicholson       Social History:     reports that he has quit smoking. He has never used smokeless tobacco. He reports that he does not currently use alcohol. He reports current drug use. Frequency: 3.00 times per week. Drug: Marijuana (Weed).    Family History:     
Spiritual Health History and Assessment/Progress Note  [facility]    Advance Care Planning,  ,  ,      Name: Fahad Mathias MRN: [id number]    Age: 64 y.o.     Sex: male   Language: English   Anabaptism: None   Acute respiratory failure with hypoxia     Date: 2/11/2025            Total Time Calculated: 30 min              Spiritual [place] [facility]            Encounter Overview/Reason: Advance Care Planning  Service Provided For: Patient     reports, Conducted Advance Care Directive with patient. Patient alert, aware, attentive. Patient receptive and responsive to  directives and explanation of HPOA.      reports, patient self-awareness and orientation in decision making in not in question at time of completion of HPOA forms. Patient present and eager to complete HPOA.      reports, Advance Care Directive Consult Completed and original copy of HPOA returned patient. Copy of HPOA placed in patients blue medical folder.     Leandra, Belief, Meaning:   Patient has beliefs or practices that help with coping during difficult times  Family/Friends No family/friends present      Importance and Influence:  Patient has spiritual/personal beliefs that influence decisions regarding their health  Family/Friends No family/friends present    Community:  Patient feels well-supported. Support system includes: Extended family  Family/Friends No family/friends present    Assessment and Plan of Care:     Patient Interventions include: Assisted in Advance Care Planning conversation  Family/Friends Interventions include: No family/friends present    Patient Plan of Care: Spiritual Care available upon further referral  Family/Friends Plan of Care: No family/friends present    Electronically signed by Chaplain Gatito on 2/11/2025 at 3:23 PM   
  Psychiatric/Behavioral:  Positive for sleep disturbance.        Physical Examination:       BP (!) 154/78   Pulse 94   Temp 97.8 °F (36.6 °C) (Oral)   Resp 20   Ht 1.778 m (5' 10\")   Wt 64.2 kg (141 lb 8.6 oz)   SpO2 95%   BMI 20.31 kg/m²      Physical Exam  Constitutional:       General: He is awake. He is not in acute distress.     Appearance: Normal appearance. He is well-developed and normal weight.   HENT:      Head: Normocephalic and atraumatic.      Jaw: There is normal jaw occlusion.      Mouth/Throat:      Lips: Pink.      Mouth: Mucous membranes are moist. No angioedema.      Dentition: Has dentures.   Eyes:      General: Lids are normal. Vision grossly intact. Gaze aligned appropriately.   Cardiovascular:      Rate and Rhythm: Normal rate and regular rhythm.      Heart sounds: Normal heart sounds.   Pulmonary:      Effort: Accessory muscle usage present.      Breath sounds: Decreased breath sounds present. No wheezing.   Abdominal:      General: Abdomen is protuberant. Bowel sounds are normal. There is no distension.      Palpations: Abdomen is soft.      Tenderness: There is no abdominal tenderness.   Musculoskeletal:      Right lower leg: No edema.      Left lower leg: No edema.   Skin:     General: Skin is warm and dry.      Coloration: Skin is pale.      Findings: Bruising present. No rash.   Neurological:      Mental Status: He is alert, oriented to person, place, and time and easily aroused. Mental status is at baseline.   Psychiatric:         Attention and Perception: Attention and perception normal.         Mood and Affect: Mood and affect normal.         Speech: Speech normal.         Behavior: Behavior normal. Behavior is not agitated. Behavior is cooperative.         Cognition and Memory: Cognition and memory normal.         Allergies:      No Known Allergies    Medications:      Current Facility-Administered Medications   Medication Dose Route Frequency Provider Last Rate Last Admin

## 2025-02-13 VITALS
HEIGHT: 70 IN | HEART RATE: 84 BPM | BODY MASS INDEX: 20.26 KG/M2 | WEIGHT: 141.54 LBS | OXYGEN SATURATION: 96 % | RESPIRATION RATE: 18 BRPM | TEMPERATURE: 98.2 F | DIASTOLIC BLOOD PRESSURE: 82 MMHG | SYSTOLIC BLOOD PRESSURE: 154 MMHG

## 2025-02-13 LAB
ALBUMIN SERPL-MCNC: 3.1 G/DL (ref 3.5–4.6)
ANION GAP SERPL CALCULATED.3IONS-SCNC: 5 MEQ/L (ref 9–15)
BASOPHILS # BLD: 0 K/UL (ref 0–0.2)
BASOPHILS NFR BLD: 0.2 %
BUN SERPL-MCNC: 19 MG/DL (ref 8–23)
CALCIUM SERPL-MCNC: 8.7 MG/DL (ref 8.5–9.9)
CHLORIDE SERPL-SCNC: 101 MEQ/L (ref 95–107)
CO2 SERPL-SCNC: 29 MEQ/L (ref 20–31)
CREAT SERPL-MCNC: 0.5 MG/DL (ref 0.7–1.2)
EOSINOPHIL # BLD: 0 K/UL (ref 0–0.7)
EOSINOPHIL NFR BLD: 0 %
ERYTHROCYTE [DISTWIDTH] IN BLOOD BY AUTOMATED COUNT: 13.2 % (ref 11.5–14.5)
GLUCOSE SERPL-MCNC: 114 MG/DL (ref 70–99)
HCT VFR BLD AUTO: 36.8 % (ref 42–52)
HGB BLD-MCNC: 12.8 G/DL (ref 14–18)
LYMPHOCYTES # BLD: 0.8 K/UL (ref 1–4.8)
LYMPHOCYTES NFR BLD: 13 %
MAGNESIUM SERPL-MCNC: 2 MG/DL (ref 1.7–2.4)
MCH RBC QN AUTO: 32 PG (ref 27–31.3)
MCHC RBC AUTO-ENTMCNC: 34.8 % (ref 33–37)
MCV RBC AUTO: 92 FL (ref 79–92.2)
METAMYELOCYTES NFR BLD MANUAL: 1 %
MONOCYTES # BLD: 0.6 K/UL (ref 0.2–0.8)
MONOCYTES NFR BLD: 10.5 %
NEUTROPHILS # BLD: 4.1 K/UL (ref 1.4–6.5)
NEUTS SEG NFR BLD: 74 %
OVALOCYTES BLD QL SMEAR: ABNORMAL
PHOSPHATE SERPL-MCNC: 3.4 MG/DL (ref 2.3–4.8)
PLATELET # BLD AUTO: 314 K/UL (ref 130–400)
PLATELET BLD QL SMEAR: ADEQUATE
POIKILOCYTOSIS BLD QL SMEAR: ABNORMAL
POTASSIUM SERPL-SCNC: 4.9 MEQ/L (ref 3.4–4.9)
RBC # BLD AUTO: 4 M/UL (ref 4.7–6.1)
SODIUM SERPL-SCNC: 135 MEQ/L (ref 135–144)
VARIANT LYMPHS NFR BLD: 1 %
WBC # BLD AUTO: 5.4 K/UL (ref 4.8–10.8)

## 2025-02-13 PROCEDURE — 94761 N-INVAS EAR/PLS OXIMETRY MLT: CPT

## 2025-02-13 PROCEDURE — 6360000002 HC RX W HCPCS: Performed by: INTERNAL MEDICINE

## 2025-02-13 PROCEDURE — 2500000003 HC RX 250 WO HCPCS: Performed by: NURSE PRACTITIONER

## 2025-02-13 PROCEDURE — 80069 RENAL FUNCTION PANEL: CPT

## 2025-02-13 PROCEDURE — 2500000003 HC RX 250 WO HCPCS: Performed by: INTERNAL MEDICINE

## 2025-02-13 PROCEDURE — 94640 AIRWAY INHALATION TREATMENT: CPT

## 2025-02-13 PROCEDURE — 6370000000 HC RX 637 (ALT 250 FOR IP): Performed by: INTERNAL MEDICINE

## 2025-02-13 PROCEDURE — 99232 SBSQ HOSP IP/OBS MODERATE 35: CPT | Performed by: INTERNAL MEDICINE

## 2025-02-13 PROCEDURE — 2700000000 HC OXYGEN THERAPY PER DAY

## 2025-02-13 PROCEDURE — 85025 COMPLETE CBC W/AUTO DIFF WBC: CPT

## 2025-02-13 PROCEDURE — 36415 COLL VENOUS BLD VENIPUNCTURE: CPT

## 2025-02-13 PROCEDURE — 6370000000 HC RX 637 (ALT 250 FOR IP): Performed by: NURSE PRACTITIONER

## 2025-02-13 PROCEDURE — 83735 ASSAY OF MAGNESIUM: CPT

## 2025-02-13 RX ORDER — PREDNISONE 20 MG/1
20 TABLET ORAL DAILY
Qty: 5 TABLET | Refills: 0 | Status: SHIPPED | OUTPATIENT
Start: 2025-02-13 | End: 2025-02-13

## 2025-02-13 RX ORDER — PREDNISONE 20 MG/1
20 TABLET ORAL DAILY
Qty: 5 TABLET | Refills: 0 | Status: SHIPPED | OUTPATIENT
Start: 2025-02-13 | End: 2025-02-18

## 2025-02-13 RX ORDER — OSELTAMIVIR PHOSPHATE 75 MG/1
75 CAPSULE ORAL 2 TIMES DAILY
Qty: 2 CAPSULE | Refills: 0 | Status: SHIPPED | OUTPATIENT
Start: 2025-02-13 | End: 2025-02-14

## 2025-02-13 RX ORDER — IPRATROPIUM BROMIDE AND ALBUTEROL SULFATE 2.5; .5 MG/3ML; MG/3ML
1 SOLUTION RESPIRATORY (INHALATION)
Status: DISCONTINUED | OUTPATIENT
Start: 2025-02-13 | End: 2025-02-13 | Stop reason: HOSPADM

## 2025-02-13 RX ORDER — OSELTAMIVIR PHOSPHATE 75 MG/1
75 CAPSULE ORAL 2 TIMES DAILY
Qty: 2 CAPSULE | Refills: 0 | Status: SHIPPED | OUTPATIENT
Start: 2025-02-13 | End: 2025-02-13

## 2025-02-13 RX ADMIN — GUAIFENESIN 200 MG: 100 LIQUID ORAL at 09:41

## 2025-02-13 RX ADMIN — AMLODIPINE BESYLATE 5 MG: 5 TABLET ORAL at 09:28

## 2025-02-13 RX ADMIN — OSELTAMIVIR PHOSPHATE 75 MG: 75 CAPSULE ORAL at 09:28

## 2025-02-13 RX ADMIN — METHYLPREDNISOLONE SODIUM SUCCINATE 40 MG: 40 INJECTION, POWDER, LYOPHILIZED, FOR SOLUTION INTRAMUSCULAR; INTRAVENOUS at 09:27

## 2025-02-13 RX ADMIN — SODIUM CHLORIDE, PRESERVATIVE FREE 5 ML: 5 INJECTION INTRAVENOUS at 09:28

## 2025-02-13 RX ADMIN — VELPATASVIR AND SOFOSBUVIR 1 TABLET: 100; 400 TABLET, FILM COATED ORAL at 09:28

## 2025-02-13 RX ADMIN — IPRATROPIUM BROMIDE AND ALBUTEROL SULFATE 1 DOSE: 2.5; .5 SOLUTION RESPIRATORY (INHALATION) at 07:31

## 2025-02-13 RX ADMIN — TRAMADOL HYDROCHLORIDE 50 MG: 50 TABLET, COATED ORAL at 10:45

## 2025-02-13 ASSESSMENT — PAIN DESCRIPTION - LOCATION: LOCATION: BACK

## 2025-02-13 ASSESSMENT — ENCOUNTER SYMPTOMS
SHORTNESS OF BREATH: 0
GASTROINTESTINAL NEGATIVE: 1
COUGH: 0

## 2025-02-13 ASSESSMENT — PAIN SCALES - GENERAL
PAINLEVEL_OUTOF10: 3
PAINLEVEL_OUTOF10: 5

## 2025-02-13 NOTE — PLAN OF CARE
Problem: Discharge Planning  Goal: Discharge to home or other facility with appropriate resources  2/11/2025 0015 by Lauryn Mansfield RN  Outcome: Progressing  Flowsheets (Taken 2/10/2025 2046)  Discharge to home or other facility with appropriate resources: Identify barriers to discharge with patient and caregiver  2/10/2025 1245 by Sapphire Alcantar RN  Outcome: Progressing  Flowsheets (Taken 2/10/2025 0800)  Discharge to home or other facility with appropriate resources:   Identify barriers to discharge with patient and caregiver   Arrange for needed discharge resources and transportation as appropriate   Identify discharge learning needs (meds, wound care, etc)   Arrange for interpreters to assist at discharge as needed   Refer to discharge planning if patient needs post-hospital services based on physician order or complex needs related to functional status, cognitive ability or social support system     Problem: Pain  Goal: Verbalizes/displays adequate comfort level or baseline comfort level  2/11/2025 0015 by Lauryn Mansfield RN  Outcome: Progressing  Flowsheets (Taken 2/10/2025 2046)  Verbalizes/displays adequate comfort level or baseline comfort level:   Encourage patient to monitor pain and request assistance   Assess pain using appropriate pain scale   Administer analgesics based on type and severity of pain and evaluate response  2/10/2025 1245 by Sapphire Alcantar RN  Outcome: Progressing     Problem: Safety - Adult  Goal: Free from fall injury  2/11/2025 0015 by Lauryn Mansfield RN  Outcome: Progressing  2/10/2025 1245 by Sapphire Alcantar RN  Outcome: Progressing     Problem: ABCDS Injury Assessment  Goal: Absence of physical injury  2/11/2025 0015 by Lauryn Mansfield RN  Outcome: Progressing  2/10/2025 1245 by Sapphire Alcantar RN  Outcome: Progressing     
  Problem: Discharge Planning  Goal: Discharge to home or other facility with appropriate resources  2/11/2025 1031 by Treva Carty RN  Outcome: Progressing  2/11/2025 0015 by Lauryn Mansfield RN  Outcome: Progressing  Flowsheets (Taken 2/10/2025 2046)  Discharge to home or other facility with appropriate resources: Identify barriers to discharge with patient and caregiver     Problem: Pain  Goal: Verbalizes/displays adequate comfort level or baseline comfort level  2/11/2025 1031 by Treva Carty RN  Outcome: Progressing  2/11/2025 0015 by Lauryn Mansfield RN  Outcome: Progressing  Flowsheets (Taken 2/10/2025 2046)  Verbalizes/displays adequate comfort level or baseline comfort level:   Encourage patient to monitor pain and request assistance   Assess pain using appropriate pain scale   Administer analgesics based on type and severity of pain and evaluate response     Problem: Safety - Adult  Goal: Free from fall injury  2/11/2025 1031 by Treva Carty RN  Outcome: Progressing  2/11/2025 0015 by Lauryn Mansfield RN  Outcome: Progressing     Problem: ABCDS Injury Assessment  Goal: Absence of physical injury  2/11/2025 1031 by Treva Carty RN  Outcome: Progressing  2/11/2025 0015 by Lauryn Mansfield RN  Outcome: Progressing     
  Problem: Discharge Planning  Goal: Discharge to home or other facility with appropriate resources  2/12/2025 0842 by Treva Craty RN  Outcome: Progressing  2/12/2025 0446 by Elizabeth Vyas RN  Outcome: Progressing  Flowsheets (Taken 2/10/2025 2046 by Lauryn Mansfield RN)  Discharge to home or other facility with appropriate resources: Identify barriers to discharge with patient and caregiver     Problem: Pain  Goal: Verbalizes/displays adequate comfort level or baseline comfort level  2/12/2025 0842 by Treva Carty RN  Outcome: Progressing  2/12/2025 0446 by Elizabeth Vyas RN  Outcome: Progressing  Flowsheets (Taken 2/12/2025 0446)  Verbalizes/displays adequate comfort level or baseline comfort level:   Encourage patient to monitor pain and request assistance   Administer analgesics based on type and severity of pain and evaluate response   Assess pain using appropriate pain scale   Implement non-pharmacological measures as appropriate and evaluate response     Problem: Safety - Adult  Goal: Free from fall injury  2/12/2025 0842 by Treva Carty RN  Outcome: Progressing  2/12/2025 0446 by Elizabeth Vyas RN  Outcome: Progressing  Flowsheets (Taken 2/12/2025 0446)  Free From Fall Injury:   Instruct family/caregiver on patient safety   Based on caregiver fall risk screen, instruct family/caregiver to ask for assistance with transferring infant if caregiver noted to have fall risk factors     Problem: ABCDS Injury Assessment  Goal: Absence of physical injury  Outcome: Progressing     
  Problem: Discharge Planning  Goal: Discharge to home or other facility with appropriate resources  Outcome: Progressing  Flowsheets  Taken 2/9/2025 2240  Discharge to home or other facility with appropriate resources:   Identify barriers to discharge with patient and caregiver   Arrange for needed discharge resources and transportation as appropriate   Identify discharge learning needs (meds, wound care, etc)   Arrange for interpreters to assist at discharge as needed   Refer to discharge planning if patient needs post-hospital services based on physician order or complex needs related to functional status, cognitive ability or social support system  Taken 2/9/2025 2224  Discharge to home or other facility with appropriate resources:   Identify barriers to discharge with patient and caregiver   Identify discharge learning needs (meds, wound care, etc)   Refer to discharge planning if patient needs post-hospital services based on physician order or complex needs related to functional status, cognitive ability or social support system   Arrange for needed discharge resources and transportation as appropriate   Arrange for interpreters to assist at discharge as needed     Problem: Pain  Goal: Verbalizes/displays adequate comfort level or baseline comfort level  Outcome: Progressing  Flowsheets (Taken 2/9/2025 2208)  Verbalizes/displays adequate comfort level or baseline comfort level:   Encourage patient to monitor pain and request assistance   Assess pain using appropriate pain scale   Administer analgesics based on type and severity of pain and evaluate response     Problem: Safety - Adult  Goal: Free from fall injury  Outcome: Progressing     Problem: ABCDS Injury Assessment  Goal: Absence of physical injury  Outcome: Progressing     
  Problem: Discharge Planning  Goal: Discharge to home or other facility with appropriate resources  Outcome: Progressing  Flowsheets (Taken 2/10/2025 2046 by Lauryn Mansfield RN)  Discharge to home or other facility with appropriate resources: Identify barriers to discharge with patient and caregiver     Problem: Pain  Goal: Verbalizes/displays adequate comfort level or baseline comfort level  Outcome: Progressing  Flowsheets (Taken 2/12/2025 0446)  Verbalizes/displays adequate comfort level or baseline comfort level:   Encourage patient to monitor pain and request assistance   Administer analgesics based on type and severity of pain and evaluate response   Assess pain using appropriate pain scale   Implement non-pharmacological measures as appropriate and evaluate response     Problem: Safety - Adult  Goal: Free from fall injury  Outcome: Progressing  Flowsheets (Taken 2/12/2025 0446)  Free From Fall Injury:   Instruct family/caregiver on patient safety   Based on caregiver fall risk screen, instruct family/caregiver to ask for assistance with transferring infant if caregiver noted to have fall risk factors     
  Problem: Pain  Goal: Verbalizes/displays adequate comfort level or baseline comfort level  2/10/2025 1245 by Sapphire Alcantar RN  Outcome: Progressing  2/9/2025 2254 by Lauryn Mansfield RN  Outcome: Progressing  Flowsheets (Taken 2/9/2025 2208)  Verbalizes/displays adequate comfort level or baseline comfort level:   Encourage patient to monitor pain and request assistance   Assess pain using appropriate pain scale   Administer analgesics based on type and severity of pain and evaluate response     Problem: Safety - Adult  Goal: Free from fall injury  2/10/2025 1245 by Sapphire Alcantar RN  Outcome: Progressing  2/9/2025 2254 by Lauryn Mansfield RN  Outcome: Progressing     Problem: ABCDS Injury Assessment  Goal: Absence of physical injury  2/10/2025 1245 by Sapphire Alcantar RN  Outcome: Progressing  2/9/2025 2254 by Lauryn Mansfield RN  Outcome: Progressing     
Nutrition Problem #1: Inadequate oral intake  Intervention: Food and/or Nutrient Delivery: Continue Current Diet, Modify Oral Nutrition Supplement  Nutritional      
See OT evaluation for all goals and OT POC. Electronically signed by Lily Wilson OTR/L on 2/11/2025 at 4:09 PM    
2/12/2025 1320 by Soo Ahuja, RD, LD)  Nutrient intake appropriate for improving, restoring, or maintaining nutritional needs:   Monitor oral intake, labs, and treatment plans   Assess nutritional status and recommend course of action   Recommend appropriate diets, oral nutritional supplements, and vitamin/mineral supplements     Problem: Respiratory - Adult  Goal: Achieves optimal ventilation and oxygenation  Outcome: Progressing  Flowsheets (Taken 2/13/2025 1200)  Achieves optimal ventilation and oxygenation:   Assess for changes in respiratory status   Assess for changes in mentation and behavior   Position to facilitate oxygenation and minimize respiratory effort   Oxygen supplementation based on oxygen saturation or arterial blood gases   Initiate smoking cessation protocol as indicated   Encourage broncho-pulmonary hygiene including cough, deep breathe, incentive spirometry   Assess the need for suctioning and aspirate as needed   Assess and instruct to report shortness of breath or any respiratory difficulty   Respiratory therapy support as indicated     Electronically signed by Lulú Menard RN on 2/13/2025 at 12:00 PM

## 2025-02-13 NOTE — DISCHARGE INSTR - DIET

## 2025-02-13 NOTE — CARE COORDINATION
CM ENTERED THE ROOM TO DISCUSS DC PLANNING.  PT IS CURRENTLY ON 3L.  CM DID DISCUSS THE POSSIBLE NEED FOR O2 TESTING AT DC.  PT DENIES ALL OTHER NEEDS.     PT DID QUALIFY FOR 2L O2.  FAXED TO [facility].

## 2025-02-13 NOTE — DISCHARGE SUMMARY
Hospital Medicine Discharge Summary    Fahad Mathias  :  1960  MRN:  [id number]    Admit date:  2025  Discharge date:  2025    Admitting Physician:  Pk Bhardwaj MD  Primary Care Physician:  Vikas Pineda PA      Discharge Diagnoses:    Principal Problem:    Acute respiratory failure with hypoxia  Active Problems:    Hypoxemia    Bronchiectasis without complication (HCC)    Influenza A    Bacteremia due to Staphylococcus    Moderate malnutrition (HCC)    Palliative care encounter    Goals of care, counseling/discussion    Advanced care planning/counseling discussion    Full code status    Dry skin  Resolved Problems:    * No resolved hospital problems. *      Hospital Course:   Fahad Mathias is a 64 y.o. male that was admitted and treated at [facility] for the following medical issues:     Acute hypoxic respiratory failure  - due to COPD exacerbation precipitated by Influenza A infection  - requiring BIPAP support on arrival, down to 3 liters of O2  - treated with IV Solumedrol, Duonebs, Tamiflu  - clinically improved  - home O2 eval prior to d/c  - continued Tamiflu, switched to Prednisone on d/c  - followed by pulmonology    Positive blood culture  - 1/2 blood cultures from admission is positive for CN Staph. likely representing skin wes contamination  - repeat blood cultures no growth  - stopped IV Vancomycin   - followed by ID    Pancytopenia   - in the setting of acute illness, chemotherapy  - thrombocytopenia resolved      Disposition - home    Patient was seen by the following consultants while admitted to [facility]:   Consults:  IP CONSULT TO SPIRITUAL SERVICES  IP CONSULT TO PULMONOLOGY  IP CONSULT TO INFECTIOUS DISEASES  PHARMACY TO DOSE VANCOMYCIN  IP CONSULT TO PALLIATIVE CARE    Significant Diagnostic Studies:    XR CHEST (SINGLE VIEW FRONTAL)    Result Date: 2025  EXAMINATION: ONE XRAY VIEW OF THE CHEST 2025 6:53 pm

## 2025-02-14 ENCOUNTER — TELEPHONE (OUTPATIENT)
Age: 65
End: 2025-02-14

## 2025-02-14 LAB — BACTERIA BLD CULT ORG #2: NORMAL

## 2025-02-14 NOTE — TELEPHONE ENCOUNTER
Care Transitions Initial Follow Up Call    Outreach made within 2 business days of discharge: Yes    Patient: Fahad Mathias Patient : 1960   MRN: [id number]  Reason for Admission: Acute respiratory failure with hypoxia   Discharge Date: 25       Spoke with: NOMAN X1    Discharge department/facility: [facility]        Scheduled appointment with PCP within 7-14 days    Follow Up  Future Appointments   Date Time Provider Department Center   2025  3:00 PM Daja Soria PA-C [facility]   2025  9:00 AM Vikas Pineda PA [facility]   3/20/2025  3:00 PM Madeleine Solorzano MD [facility]   2025  4:30 PM Vikas Pineda PA [facility] DEP       Kayla Winters, MA     02-Jun-2018

## 2025-02-14 NOTE — PROGRESS NOTES
02/10/25 0756   RT Protocol   History Pulmonary Disease 1   Respiratory pattern 4   Breath sounds 2   Cough 0   Indications for Bronchodilator Therapy Decreased or absent breath sounds   Bronchodilator Assessment Score 7       
   02/11/25 2200   RT Protocol   History Pulmonary Disease 1   Respiratory pattern 2   Breath sounds 2   Cough 0   Indications for Bronchodilator Therapy Decreased or absent breath sounds   Bronchodilator Assessment Score 5       
   02/13/25 0900   RT Protocol   History Pulmonary Disease 1   Respiratory pattern 0   Breath sounds 2   Cough 1   Indications for Bronchodilator Therapy Decreased or absent breath sounds   Bronchodilator Assessment Score 4       
   02/13/25 1545   Resting (Room Air)   SpO2 91   HR 88   During Walk (Room Air)   SpO2 87      Rate of Dyspnea 1   During Walk (On O2)   SpO2 92      O2 Device Nasal cannula   O2 Flow Rate (l/min) 2 l/min   Rate of Dyspnea 0   After Walk   SpO2 91   HR 94   Rate of Dyspnea 0   Does the Patient Qualify for Home O2 Yes   Liter Flow at Rest 0   Liter Flow on Exertion 2   Does the Patient Need Portable Oxygen Tanks Yes       
CLINICAL PHARMACY NOTE: MEDS TO BEDS    Total # of Prescriptions Filled: 2   The following medications were delivered to the patient:  Oseltamivir 75 mg Cap  Prednisone 20 mg Tab    Additional Documentation:    
Cecil, Hospitalist np notified of elevated bp and prn orders placed. Electronically signed by Lauryn Mansfield RN on 2/9/2025 at 11:17 PM    
Discharge instructions given to patient. Patient ANOx4. Portable oxygen tank at bedside and with patient.   
Hospitalist Progress Note      PCP: Vikas Pineda PA    Date of Admission: 2/9/2025    Chief Complaint:  no acute events, is afebrile, stable HD, on 4 liters of O2     Medications:  Reviewed    Infusion Medications    sodium chloride       Scheduled Medications    ipratropium 0.5 mg-albuterol 2.5 mg  1 Dose Inhalation TID RT    methylPREDNISolone  40 mg IntraVENous Q12H    amitriptyline  100 mg Oral Nightly    amLODIPine  5 mg Oral Daily    Sofosbuvir-Velpatasvir  1 tablet Oral Daily    vancomycin (VANCOCIN) intermittent dosing (placeholder)   Other RX Placeholder    vancomycin  1,000 mg IntraVENous Q12H    sodium chloride flush  5-40 mL IntraVENous 2 times per day    oseltamivir  75 mg Oral BID     PRN Meds: traMADol, sodium chloride flush, sodium chloride, ondansetron **OR** ondansetron, polyethylene glycol, acetaminophen **OR** acetaminophen, guaiFENesin, labetalol      Intake/Output Summary (Last 24 hours) at 2/11/2025 1010  Last data filed at 2/11/2025 0609  Gross per 24 hour   Intake 3121.79 ml   Output 715 ml   Net 2406.79 ml       Exam:    BP (!) 155/83   Pulse 96   Temp 97.8 °F (36.6 °C) (Oral)   Resp (!) 33   Ht 1.778 m (5' 10\")   Wt 64.4 kg (141 lb 15.6 oz)   SpO2 96%   BMI 20.37 kg/m²     General appearance: appears stated age and cooperative.  Respiratory:  diminished bilaterally .  Cardiovascular: Regular rate and rhythm, S1/S2  Abdomen: Soft.  Musculoskeletal: No edema bilaterally.      Labs:   Recent Labs     02/09/25  1913 02/09/25  1958 02/09/25  2251 02/10/25  0354 02/11/25  0442   WBC 5.8  --   --  3.0* 5.1   HGB 14.8   < > 13.3* 12.7* 12.1*   HCT 41.1*  --   --  35.4* 35.3*   PLT 76*  --   --  84* 163    < > = values in this interval not displayed.     Recent Labs     02/09/25  1913 02/09/25  1924 02/09/25  2251 02/10/25  0354 02/11/25  0442   *  --   --  132* 137   K 4.7  --   --  4.6 4.6   CL 91*  --   --  98 103   CO2 28  --   --  23 27   BUN 37*  --   --  32* 25*   CREATININE 
Hospitalist Progress Note      PCP: Vikas Pineda PA    Date of Admission: 2/9/2025    Chief Complaint:  no acute events, is afebrile, stable HD, on 4 liters of O2    Medications:  Reviewed    Infusion Medications    sodium chloride       Scheduled Medications    ipratropium 0.5 mg-albuterol 2.5 mg  1 Dose Inhalation BID RT    nystatin, stomahesive in petrolatum   Topical Nightly    methylPREDNISolone  40 mg IntraVENous Q12H    amitriptyline  100 mg Oral Nightly    amLODIPine  5 mg Oral Daily    Sofosbuvir-Velpatasvir  1 tablet Oral Daily    sodium chloride flush  5-40 mL IntraVENous 2 times per day    oseltamivir  75 mg Oral BID     PRN Meds: traMADol, sodium chloride flush, sodium chloride, ondansetron **OR** ondansetron, polyethylene glycol, acetaminophen **OR** acetaminophen, guaiFENesin, labetalol      Intake/Output Summary (Last 24 hours) at 2/13/2025 1329  Last data filed at 2/13/2025 0919  Gross per 24 hour   Intake 600 ml   Output 1500 ml   Net -900 ml       Exam:    BP (!) 145/80   Pulse 86   Temp 98.2 °F (36.8 °C) (Oral)   Resp 20   Ht 1.778 m (5' 10\")   Wt 64.2 kg (141 lb 8.6 oz)   SpO2 91%   BMI 20.31 kg/m²     General appearance: appears stated age and cooperative.  Respiratory:  diminished with faint wheezing bilaterally .  Cardiovascular: Regular rate and rhythm, S1/S2  Abdomen: Soft.  Musculoskeletal: No edema bilaterally.      Labs:   Recent Labs     02/11/25 0442 02/12/25  0408 02/13/25  0552   WBC 5.1 4.9 5.4   HGB 12.1* 11.8* 12.8*   HCT 35.3* 34.4* 36.8*    237 314     Recent Labs     02/11/25 0442 02/12/25  0408 02/13/25  0552    135 135   K 4.6 4.6 4.9    101 101   CO2 27 29 29   BUN 25* 23 19   CREATININE 0.56* 0.52* 0.50*   CALCIUM 8.4* 8.5 8.7   PHOS 2.4 2.8 3.4     No results for input(s): \"AST\", \"ALT\", \"BILIDIR\", \"BILITOT\", \"ALKPHOS\" in the last 72 hours.    No results for input(s): \"INR\" in the last 72 hours.  No results for input(s): \"CKTOTAL\", \"TROPONINI\" 
Hospitalist Progress Note      PCP: Vikas Pineda PA    Date of Admission: 2/9/2025    Chief Complaint:  no acute events, is afebrile, stable HD, on BIPAP overnight, on 6 liters of O2 this morning per nursing staff    Medications:  Reviewed    Infusion Medications    sodium chloride       Scheduled Medications    ipratropium 0.5 mg-albuterol 2.5 mg  1 Dose Inhalation TID RT    methylPREDNISolone  40 mg IntraVENous Q12H    sodium chloride flush  5-40 mL IntraVENous 2 times per day    oseltamivir  75 mg Oral BID     PRN Meds: sodium chloride flush, sodium chloride, ondansetron **OR** ondansetron, polyethylene glycol, acetaminophen **OR** acetaminophen, guaiFENesin, labetalol      Intake/Output Summary (Last 24 hours) at 2/10/2025 1124  Last data filed at 2/10/2025 0523  Gross per 24 hour   Intake 508.77 ml   Output 175 ml   Net 333.77 ml       Exam:    BP (!) 149/86   Pulse 89   Temp 97.9 °F (36.6 °C)   Resp 26   Ht 1.778 m (5' 10\")   Wt 64.4 kg (141 lb 15.6 oz)   SpO2 99%   BMI 20.37 kg/m²     General appearance: appears stated age and cooperative.  Respiratory:  diminished bilaterally .  Cardiovascular: Regular rate and rhythm, S1/S2  Abdomen: Soft.  Musculoskeletal: No edema bilaterally.      Labs:   Recent Labs     02/09/25  1913 02/09/25  1958 02/09/25  2251 02/10/25  0354   WBC 5.8  --   --  3.0*   HGB 14.8 13.9 13.3* 12.7*   HCT 41.1*  --   --  35.4*   PLT 76*  --   --  84*     Recent Labs     02/09/25  1913 02/09/25  1924 02/09/25  1958 02/09/25  2251 02/10/25  0354   *  --   --   --  132*   K 4.7  --   --   --  4.6   CL 91*  --   --   --  98   CO2 28  --   --   --  23   BUN 37*  --   --   --  32*   CREATININE 0.69*   < > 0.7* 0.6* 0.58*   CALCIUM 9.0  --   --   --  8.3*    < > = values in this interval not displayed.     Recent Labs     02/09/25  1913 02/10/25  0354   AST 55* 39   ALT 44* 35   BILITOT 0.4 0.3   ALKPHOS 101 79     No results for input(s): \"INR\" in the last 72 hours.  No results 
INPATIENT PROGRESS NOTES    PATIENT NAME: Faahd Mathias  MRN: [id number]  SERVICE DATE:  February 13, 2025   SERVICE TIME:  4:29 PM      PRIMARY SERVICE: Pulmonary Disease    CHIEF COMPLAIN: Hypoxia      INTERVAL HPI: Patient seen and examined at bedside, Interval Notes, orders reviewed. Nursing notes noted  He said he is feeling better.  He is going home today.  No shortness of breath.  No chest pain.  No fever.  No nausea, vomiting or diarrhea.  He was able to use BiPAP therapy.         OBJECTIVE   I/O:24HR INTAKE/OUTPUT:    Intake/Output Summary (Last 24 hours) at 2/13/2025 1629  Last data filed at 2/13/2025 1300  Gross per 24 hour   Intake 720 ml   Output 1500 ml   Net -780 ml     02/12 0701 - 02/13 0700  In: 360 [P.O.:360]  Out: 700 [Urine:700]  Body mass index is 20.31 kg/m².    PHYSICAL EXAM:  Vitals:  BP (!) 154/82   Pulse 84   Temp 98.2 °F (36.8 °C) (Oral)   Resp 18   Ht 1.778 m (5' 10\")   Wt 64.2 kg (141 lb 8.6 oz)   SpO2 96%   BMI 20.31 kg/m²     General: Alert, awake .comfortable in bed, No distress.  Head: Atraumatic , Normocephalic   Eyes: PERRL. No sclera icterus. No conjunctival injection. No discharge   ENT: No nasal  discharge. Pharynx clear.  Neck:  Trachea midline. No thyromegaly, no JVD, No cervical adenopathy.  Chest : Bilaterally symmetrical ,Normal effort,  No accessory muscle use  Lung : Diminished breath sound bilaterally No Rales.  Few scattered wheezing. No rhonchi.   Heart:: Normal rate. Regular rhythm. No mumur ,  Rub or gallop  ABD: Non-tender. Non-distended. No masses. No organmegaly. Normal bowel sounds. No hernia.  Ext : No Pitting both leg , No Cyanosis No clubbing  Neuro: no focal weakness    Labs:  CBC:   Recent Labs     02/11/25  0442 02/12/25  0408 02/13/25  0552   WBC 5.1 4.9 5.4   HGB 12.1* 11.8* 12.8*   HCT 35.3* 34.4* 36.8*    237 314     BMP:    Recent Labs     02/11/25  0442 02/12/25  0408 02/13/25  0552    135 135   K 4.6 4.6 4.9    101 101 
INPATIENT PROGRESS NOTES    PATIENT NAME: Fahad Mathias  MRN: [id number]  SERVICE DATE:  February 11, 2025   SERVICE TIME:  5:09 PM      PRIMARY SERVICE: Pulmonary Disease    CHIEF COMPLAIN: Hypoxia      INTERVAL HPI: Patient seen and examined at bedside, Interval Notes, orders reviewed. Nursing notes noted  He said he is feeling better.  He is still coughing.  Shortness of breath is less.  No chest pain.  No fever.  No nausea vomiting diarrhea.         OBJECTIVE   I/O:24HR INTAKE/OUTPUT:    Intake/Output Summary (Last 24 hours) at 2/11/2025 1709  Last data filed at 2/11/2025 0609  Gross per 24 hour   Intake 3121.79 ml   Output 715 ml   Net 2406.79 ml     02/10 0701 - 02/11 0700  In: 3241.8 [P.O.:120; I.V.:2372.5]  Out: 965 [Urine:965]  Body mass index is 20.37 kg/m².    PHYSICAL EXAM:  Vitals:  BP (!) 165/77   Pulse 96   Temp 97.8 °F (36.6 °C) (Oral)   Resp 25   Ht 1.778 m (5' 10\")   Wt 64.4 kg (141 lb 15.6 oz)   SpO2 97%   BMI 20.37 kg/m²     General: Alert, awake .comfortable in bed, No distress.  Head: Atraumatic , Normocephalic   Eyes: PERRL. No sclera icterus. No conjunctival injection. No discharge   ENT: No nasal  discharge. Pharynx clear.  Neck:  Trachea midline. No thyromegaly, no JVD, No cervical adenopathy.  Chest : Bilaterally symmetrical ,Normal effort,  No accessory muscle use  Lung : Diminished breath sound bilaterally No Rales.  Few scattered wheezing. No rhonchi.   Heart:: Normal rate. Regular rhythm. No mumur ,  Rub or gallop  ABD: Non-tender. Non-distended. No masses. No organmegaly. Normal bowel sounds. No hernia.  Ext : No Pitting both leg , No Cyanosis No clubbing  Neuro: no focal weakness    Labs:  CBC:   Recent Labs     02/09/25  1913 02/09/25  1958 02/09/25  2251 02/10/25  0354 02/11/25  0442   WBC 5.8  --   --  3.0* 5.1   HGB 14.8   < > 13.3* 12.7* 12.1*   HCT 41.1*  --   --  35.4* 35.3*   PLT 76*  --   --  84* 163    < > = values in this interval not displayed.     BMP:    Recent 
Infectious Disease     Patient Name: Fahad Mathias  Date: 2/11/2025  YOB: 1960  Medical Record Number: [id number]      Staphylococcus bacteremia  Influenza A        Patient has Awyleb-f-Tvgr        Patient presents with shortness of breath hypoxia found to be influenza A positive  L for 2 to 3 days increasing shortness of breath cough sputum production  Admitted required to be placed on BiPAP        CTA CHEST W WO CONTRAST [[id number]]  Status: Final result     PACS Images     Show images for CTA CHEST W WO CONTRAST  CTA CHEST W WO CONTRAST  Order: [id number]  Status: Final result       Visible to patient: Yes (not seen)       Next appt: 02/17/2025 at 03:00 PM in Radiology (Daja Soria PA-C)    0 Result Notes  Details    Reading Physician Reading Date Result Priority   Singh, Kylah Sinclair MD  [phone] 2/9/2025      Narrative & Impression  EXAMINATION:  CTA OF THE CHEST WITH AND WITHOUT CONTRAST 2/9/2025 7:36 pm     TECHNIQUE:  CTA of the chest was performed before and after the administration of  intravenous contrast.  Multiplanar reformatted images are provided for  review.  MIP images are provided for review. Automated exposure control,  iterative reconstruction, and/or weight based adjustment of the mA/kV was  utilized to reduce the radiation dose to as low as reasonably achievable.     COMPARISON:  12/18/2024     HISTORY:  ORDERING SYSTEM PROVIDED HISTORY: Patient with hepatocellular carcinoma,  chemo, increased work of breathing, tachycardic and hypoxic, concern for  potential PE or pneumonitis, chest x-ray did not reveal any new findings  TECHNOLOGIST PROVIDED HISTORY:  Reason for exam:->Patient with hepatocellular carcinoma, chemo, increased  work of breathing, tachycardic and hypoxic, concern for potential PE or  pneumonitis, chest x-ray did not reveal any new findings  Additional Contrast?->1  What reading provider will be dictating this exam?->CRC     FINDINGS:  Aorta: No evidence of 
Infectious Disease     Patient Name: Fahad Mathias  Date: 2/13/2025  YOB: 1960  Medical Record Number: [id number]      Staphylococcus bacteremia  Influenza A        Patient has Fjkphy-r-Leyq        Patient presents with shortness of breath hypoxia found to be influenza A positive  L for 2 to 3 days increasing shortness of breath cough sputum production  Admitted required to be placed on BiPAP        CTA CHEST W WO CONTRAST [[id number]]  Status: Final result     PACS Images     Show images for CTA CHEST W WO CONTRAST  CTA CHEST W WO CONTRAST  Order: [id number]  Status: Final result       Visible to patient: Yes (not seen)       Next appt: 02/17/2025 at 03:00 PM in Radiology (Daja Soria PA-C)    0 Result Notes  Details    Reading Physician Reading Date Result Priority   Singh, Kylah Sinclair MD  [phone] 2/9/2025      Narrative & Impression  EXAMINATION:  CTA OF THE CHEST WITH AND WITHOUT CONTRAST 2/9/2025 7:36 pm     TECHNIQUE:  CTA of the chest was performed before and after the administration of  intravenous contrast.  Multiplanar reformatted images are provided for  review.  MIP images are provided for review. Automated exposure control,  iterative reconstruction, and/or weight based adjustment of the mA/kV was  utilized to reduce the radiation dose to as low as reasonably achievable.     COMPARISON:  12/18/2024     HISTORY:  ORDERING SYSTEM PROVIDED HISTORY: Patient with hepatocellular carcinoma,  chemo, increased work of breathing, tachycardic and hypoxic, concern for  potential PE or pneumonitis, chest x-ray did not reveal any new findings  TECHNOLOGIST PROVIDED HISTORY:  Reason for exam:->Patient with hepatocellular carcinoma, chemo, increased  work of breathing, tachycardic and hypoxic, concern for potential PE or  pneumonitis, chest x-ray did not reveal any new findings  Additional Contrast?->1  What reading provider will be dictating this exam?->CRC     FINDINGS:  Aorta: No evidence of 
[facility] OCCUPATIONAL THERAPY EVALUATION - ACUTE     NAME: Fahad Mathias  : 1960 (64 y.o.)  MRN: [id number]  CODE STATUS: Full Code  Room: [place] [address]    Date of Service: 2025    Patient Diagnosis(es): Hypoxemia [R09.02]  Thrombocytopenia (HCC) [D69.6]  Bronchitis [J40]  Influenza A [J10.1]  Acute respiratory failure with hypoxia [J96.01]  Bronchiectasis without complication (HCC) [J47.9]   Patient Active Problem List    Diagnosis Date Noted    Bacteremia due to Staphylococcus 2025    Hypoxemia 02/10/2025    Bronchiectasis without complication (HCC) 02/10/2025    Influenza A 02/10/2025    Acute respiratory failure with hypoxia 2025    Liver cell carcinoma (HCC) 01/10/2025        Past Medical History:   Diagnosis Date    Back pain     Hypertension      Past Surgical History:   Procedure Laterality Date    COLONOSCOPY N/A 2024    COLONOSCOPY DIAGNOSTIC performed by [facility] at [facility]    CT NEEDLE BIOPSY LIVER PERCUTANEOUS  2024    CT NEEDLE BIOPSY LIVER PERCUTANEOUS 2024 [facility] CT SCAN    IR PORT PLACEMENT > 5 YEARS  1/10/2025    IR PORT PLACEMENT > 5 YEARS 1/10/2025 [facility] SPECIAL PROCEDURE    OTHER SURGICAL HISTORY  2024    CT guided biopsy and liver ablation with montiored anesthesia care - performed by Dr. Nicholson    TUNNELED VENOUS PORT PLACEMENT Right 01/10/2025    Bard ClearVUE Power Port 8F (LOT: [id number] / Exp: 2026) placed by Dr. Nicholson        Restrictions  Restrictions/Precautions: Fall Risk, Isolation  Activity Level: Up as Tolerated   Up as Tolerated     Safety Devices: Safety Devices  Type of Devices: All fall risk precautions in place;Call light within reach;Left in bed     Patient's date of birth confirmed: Yes    General:  Chart Reviewed: Yes  Patient assessed for rehabilitation services?: Yes    Subjective  Subjective: \"I just feel so short of breath\"       Pain at start of treatment: Yes: 4/10    Pain at end of treatment: 
Patient arrived to unit from er via cart.  Admission completed.  Patient with increased work of breathing and placed on bipap.  Cecil hospitalist np, at bedside to assess patient. Skin assessment completed.  Home medications reviewed.  Patient educated on importance of NPO status with bipap mask.  Falls prevention in place.  Electronically signed by Lauryn Mansfield RN on 2/9/2025 at 10:57 PM      
Patient asking for break from bipap-per respiratory, patient can go on 4lnc if ok with Dr. Jacob, hospitalist, np states he can try to be off mask for awhile and he will come down to assess him.  Electronically signed by Lauryn Mansfield RN on 2/10/2025 at 2:44 AM    
Patient back on bipap at this time due to increased respiration rate and work of breathing on 4lnc.  Electronically signed by Lauryn Mansfield RN on 2/10/2025 at 3:36 AM    
Patient given 1 time dose of iv ativan and then placed on bipap.  Patient tolerated bipap well overnight.  Electronically signed by Lauryn Mansfield RN on 2/11/2025 at 7:03 AM    
Patient placed on 4lnc at this time. Electronically signed by Lauryn Mansfield RN on 2/10/2025 at 2:48 AM    
Patient reports having a hard time resting with bipap mask on.  Requesting something to help him sleep.  Perfect serve to hospitalist np for orders.  Awaiting response.  Electronically signed by Lauryn Mansfield RN on 2/10/2025 at 2:03 AM    
Physical Therapy  Facility/Department: [facility] MED SURG W480/W480-01  Physical Therapy Discharge      NAME: Fahad Mathias    : 1960 (64 y.o.)  MRN: [id number]    Account: [id number]  Gender: male      Patient has been discharged from acute care hospital. DC patient from current PT program.      Electronically signed by Cheryl Bennett PT on 25 at 1:32 PM EST    
Physical Therapy Med Surg Daily Treatment Note  [place] [facility]  Room: [address] [address]       NAME: Fahad Mathias  : 1960 (64 y.o.)  MRN: [id number]  CODE STATUS: Full Code    Date of Service: 2025    Patient Diagnosis(es): Hypoxemia [R09.02]  Thrombocytopenia (HCC) [D69.6]  Bronchitis [J40]  Influenza A [J10.1]  Acute respiratory failure with hypoxia [J96.01]  Bronchiectasis without complication (HCC) [J47.9]   Chief Complaint   Patient presents with    Shortness of Breath     Patient Active Problem List    Diagnosis Date Noted    Moderate malnutrition (HCC) 2025    Bacteremia due to Staphylococcus 2025    Hypoxemia 02/10/2025    Bronchiectasis without complication (HCC) 02/10/2025    Influenza A 02/10/2025    Acute respiratory failure with hypoxia 2025    Liver cell carcinoma (HCC) 01/10/2025        Past Medical History:   Diagnosis Date    Back pain     Hypertension      Past Surgical History:   Procedure Laterality Date    COLONOSCOPY N/A 2024    COLONOSCOPY DIAGNOSTIC performed by Alejandro Meredith MD at [facility]    CT NEEDLE BIOPSY LIVER PERCUTANEOUS  2024    CT NEEDLE BIOPSY LIVER PERCUTANEOUS 2024 [facility] [place]    IR PORT PLACEMENT > 5 YEARS  1/10/2025    IR PORT PLACEMENT > 5 YEARS 1/10/2025 [facility] [place]URE    OTHER SURGICAL HISTORY  2024    CT guided biopsy and liver ablation with montiored anesthesia care - performed by Dr. Nicholson    TUNNELED VENOUS PORT PLACEMENT Right 01/10/2025    Bard ClearVUE Power Port 8F (LOT: [id number] / Exp: 2026) placed by Dr. Nicholson       Chart Reviewed: Yes  Patient assessed for rehabilitation services?: Yes  Family/Caregiver Present: No    Restrictions:  Restrictions/Precautions: Fall Risk;Isolation    SUBJECTIVE:   Subjective: Patient resting in bed. Motivated to participate,  Response To Previous Treatment: Patient with no complaints from previous session.    Pain  Patient denies 
Physical Therapy Med Surg Initial Assessment  Facility/Department: [facility]  Room: 02/[place] [address]       NAME: Fahad Mathias  : 1960 (64 y.o.)  MRN: [id number]  CODE STATUS: Full Code    Date of Service: 2025    Patient Diagnosis(es): Hypoxemia [R09.02]  Thrombocytopenia (HCC) [D69.6]  Bronchitis [J40]  Influenza A [J10.1]  Acute respiratory failure with hypoxia [J96.01]  Bronchiectasis without complication (HCC) [J47.9]   Chief Complaint   Patient presents with    Shortness of Breath     Patient Active Problem List    Diagnosis Date Noted    Bacteremia due to Staphylococcus 2025    Hypoxemia 02/10/2025    Bronchiectasis without complication (HCC) 02/10/2025    Influenza A 02/10/2025    Acute respiratory failure with hypoxia 2025    Liver cell carcinoma (HCC) 01/10/2025        Past Medical History:   Diagnosis Date    Back pain     Hypertension      Past Surgical History:   Procedure Laterality Date    COLONOSCOPY N/A 2024    COLONOSCOPY DIAGNOSTIC performed by Alejandro Meredith MD at [facility]    CT NEEDLE BIOPSY LIVER PERCUTANEOUS  2024    CT NEEDLE BIOPSY LIVER PERCUTANEOUS 2024 [facility] CT SCAN    IR PORT PLACEMENT > 5 YEARS  1/10/2025    IR PORT PLACEMENT > 5 YEARS 1/10/2025 [facility] SPECIAL PROCEDURE    OTHER SURGICAL HISTORY  2024    CT guided biopsy and liver ablation with montiored anesthesia care - performed by Dr. Nicholson    TUNNELED VENOUS PORT PLACEMENT Right 01/10/2025    Bard ClearVUE Power Port 8F (LOT: [id number] / Exp: 2026) placed by Dr. Nicholson       Patient assessed for rehabilitation services?: Yes  Family/Caregiver Present: No    Restrictions:  Restrictions/Precautions: Fall Risk     SUBJECTIVE:   Pain   \"Liver pain\"- pt declined need for intervention       Prior Level of Function:  Social/Functional History  Lives With: Alone  Type of Home: House  Home Layout: One level  Home Access: Stairs to enter without rails  Entrance Stairs - 
Pt not wearing bipap @ this time    
[facility]   Pharmacy Pharmacokinetic Monitoring Service - Vancomycin    Consulting Provider: Andressa   Indication: sepsis  Target Concentration: Goal AUC/BIRDIE 400-600 mg*hr/L  Day of Therapy: 2  Additional Antimicrobials: none    Pertinent Laboratory Values:   Wt Readings from Last 1 Encounters:   02/09/25 64.4 kg (141 lb 15.6 oz)     Temp Readings from Last 1 Encounters:   02/11/25 97.8 °F (36.6 °C) (Oral)     Estimated Creatinine Clearance: 120 mL/min (A) (based on SCr of 0.56 mg/dL (L)).  Recent Labs     02/10/25  0354 02/11/25  0442   CREATININE 0.58* 0.56*   BUN 32* 25*   WBC 3.0* 5.1     Procalcitonin: 0.21 2/11    Pertinent Cultures:  Culture Date Source Results   2/10 blood pending   MRSA Nasal Swab: N/A. Non-respiratory infection.    Recent vancomycin administrations                     vancomycin (VANCOCIN) 1,000 mg in sodium chloride 0.9 % 250 mL IVPB (mg) 1,000 mg New Bag 02/11/25 0609    vancomycin (VANCOCIN) 1,500 mg in sodium chloride 0.9 % 500 mL IVPB (mg) 1,500 mg New Bag 02/10/25 1850                    Assessment: current dosing 1000mg IV q12h    Plan:  Current dosing regimen is sub-therapeutic  Increase dose to 1250 mg IV q12h, predicted   Vancomycin concentration modified to 02/13 with am labs   Pharmacy will continue to monitor patient and adjust therapy as indicated    Thank you for the consult,  Lauryn Morse RPH  2/11/2025 1:28 PM   
[facility]   Pharmacy Pharmacokinetic Monitoring Service - Vancomycin     Fahad Mathias is a 64 y.o. male starting on vancomycin therapy for sepsis of unknown etiology. Pharmacy consulted by Dr. Crisostomo for monitoring and adjustment.    Target Concentration: Goal AUC/BIRDIE 400-600 mg*hr/L    Additional Antimicrobials: none    Pertinent Laboratory Values:   Wt Readings from Last 1 Encounters:   02/09/25 64.4 kg (141 lb 15.6 oz)     Temp Readings from Last 1 Encounters:   02/10/25 97.7 °F (36.5 °C) (Oral)     Estimated Creatinine Clearance: 116 mL/min (A) (based on SCr of 0.58 mg/dL (L)).  Recent Labs     02/09/25  1913 02/09/25  1924 02/09/25  2251 02/10/25  0354   CREATININE 0.69*   < > 0.6* 0.58*   BUN 37*  --   --  32*   WBC 5.8  --   --  3.0*    < > = values in this interval not displayed.     Pertinent Cultures:  Culture Date Source Results   02/09/25 Blood 1 of 2 cx   Gram stain aerobic bottle  Gram positive cocci in clusters-resembling Staph  1 out of 2 blood cultures  Further results to follow  Further testing performed at [facility]      02/09/25 Repeat blood cx  Pending   MRSA Nasal Swab: N/A. Non-respiratory infection.    Plan:  Dosing recommendations based on Bayesian software  Start vancomycin 1500 mg x 1 dose followed by 1000 mg Q12H  Anticipated AUC of 405 and trough concentration of 11 at steady state  Renal labs as indicated   Vancomycin concentration ordered for 02/12/25 @ 0530   Pharmacy will continue to monitor patient and adjust therapy as indicated    Thank you for the consult,  Kelly Corrales RPH  2/10/2025 6:14 PM   
Respiratory called for home o2 eval.  
Spiritual Health History and Assessment/Progress Note  [facility]    Advance Care Planning,  ,  ,      Name: Fahad Mathias MRN: [id number]    Age: 64 y.o.     Sex: male   Language: English   Scientology: None   Acute respiratory failure with hypoxia     Date: 2/11/2025            Total Time Calculated: 30 min              Spiritual Assessment [place] in [facility]        Referral/Consult From: Physician   Encounter Overview/Reason: Advance Care Planning  Service Provided For: Patient    REFERRAL: Spiritual Consult  ASSESSMENT: The pt was sitting in his room and was open for a short visit, the pt shared some of his physical setbacks and some of his personal life. The pt shared his leandra journey and his [employer]alian beliefs.  INTERVENTION: The  provided active listening, and supportive presence, and prayer.   OUTCOME: The pt was appreciative for the prayer and support.  PLAN:  No plan at the moment as the pt may discharge.    Leandra, Belief, Meaning:   Patient identifies as spiritual  Family/Friends No family/friends present      Importance and Influence:  Patient has spiritual/personal beliefs that influence decisions regarding their health  Family/Friends No family/friends present    Community:  Patient feels well-supported. Support system includes: Friends and Extended family  Family/Friends No family/friends present    Assessment and Plan of Care:     Patient Interventions include: Facilitated expression of thoughts and feelings  Family/Friends Interventions include: No family/friends present    Patient Plan of Care: Spiritual Care available upon further referral  Family/Friends Plan of Care: No family/friends present    Electronically signed by Chaplain Alexandra on 2/11/2025 at 3:36 PM   
With evening assessment, patient requesting medication he got last night to help him rest with bipap on.  Perfect serve to hospitalist np asking for ativan that he received a one time dose for lastnight that helped with bipap.  Awaiting response.  Electronically signed by Lauryn Mansfield RN on 2/10/2025 at 10:11 PM    
Protein Oral Supplement  ADULT ORAL NUTRITION SUPPLEMENT; Lunch, Dinner; Frozen Oral Supplement    Anthropometric Measures:  Height: 177.8 cm (5' 10\")  Ideal Body Weight (IBW): 166 lbs (75 kg)    Admission Body Weight: 63.5 kg (140 lb) (stated)  Current Body Weight: 64 kg (141 lb), 84.9 % IBW. Weight Source: Bed scale  Current BMI (kg/m2): 20.2  Usual Body Weight: 61.2 kg (135 lb) (( 9/2024), 171# ( 2017))     % Weight Change (Calculated): 4.4                    BMI Categories: Underweight (BMI less than 22) age over 65    Estimated Daily Nutrient Needs:  Energy Requirements Based On: Kcal/kg  Weight Used for Energy Requirements: Current  Energy (kcal/day): ~4468-7124 kcals @ 30 kcal/kg  Weight Used for Protein Requirements: Current  Protein (g/day): ~77 g protein @ 1.2 g/kg  Method Used for Fluid Requirements: 1 ml/kcal  Fluid (ml/day): ~2000    Nutrition Diagnosis:   Inadequate oral intake related to early satiety, decreased appetite as evidenced by intake 0-25%, intake 26-50%      Nutrition Interventions:   Food and/or Nutrient Delivery: Continue Current Diet, Modify Oral Nutrition Supplement  Nutrition Education/Counseling: No recommendation at this time  Coordination of Nutrition Care: Continue to monitor while inpatient       Goals:  Goals: PO intake 75% or greater, by next RD assessment, Other          Nutrition Monitoring and Evaluation:      Food/Nutrient Intake Outcomes: Food and Nutrient Intake  Physical Signs/Symptoms Outcomes: Constipation, Meal Time Behavior, Weight    Discharge Planning:    Too soon to determine     ANA PAULA MOYA RD, LD    
days          Electronically signed by MADAN DONOHUE MD on 2/12/2025 at 10:13 AM  
Staphylococcus lugdunensis by PCR Not Detected      Staphylococcus species by PCR DETECTED      Serratia marcescens by PCR Not Detected      Streptococcus pneumoniae by PCR Not Detected      Streptococcus pyogenes  by PCR Not Detected      Streptococcus species by PCR Not Detected      Stenotrophomonas maltophilia by PCR Not Detected      Candida albicans by PCR Not Detected      Candida auris by PCR Not Detected      Candida glabrata by PCR Not Detected      Candida krusei by PCR Not Detected      Candida parapsilosis by PCR Not Detected      Candida tropicalis by PCR Not Detected      Cryptococcus neoformans/gattii by PCR Not Detected     Narrative:       CALL  Sosa  [facility] tel. [phone],  Blood culture called & read back by Katharine, 02/10/2025 14:21, by [facility]     Blood Culture 1 [[id number]] (Abnormal) Collected: 02/09/25 1913     Specimen: Blood Updated: 02/10/25 1420      Blood Culture, Routine --      Gram stain aerobic bottle  Gram positive cocci in clusters-resembling Staph  1 out of 2 blood cultures  Further results to follow  Further testing performed at [facility]       Narrative:       ORDER#: [id number]                          ORDERED BY: SIMI MARTE  SOURCE: Blood                              COLLECTED:  02/09/25 19:13             Review of Systems   Constitutional:  Negative for chills, diaphoresis, fatigue and fever.   Respiratory:  Negative for cough and shortness of breath.    Gastrointestinal: Negative.    Musculoskeletal:  Negative for myalgias.   Skin: Negative.         Physical Exam  Constitutional:       Appearance: He is ill-appearing.   Cardiovascular:      Heart sounds: Normal heart sounds. No murmur heard.  Pulmonary:      Effort: Pulmonary effort is normal. No respiratory distress.      Breath sounds: Rhonchi present. No wheezing or rales.   Abdominal:      General: Abdomen is flat. Bowel sounds are normal. There is no distension.      Palpations: There is no mass.      
pleural effusion or pneumothorax. Upper Abdomen: Limited images of the upper abdomen reveals a heterogeneous low-density area with segment 8 of the liver..  The visualized gallbladder, pancreas spleen and adrenal glands appear within normal range. Soft Tissues/Bones: No acute bone or soft tissue abnormality.     1. No signs of pulmonary emboli. 2. Signs of bronchiectasis and fibrosis within the mid lower lung fields. 3. No signs of an acute cardiopulmonary process.     CTA ABDOMEN W WO CONTRAST    1.  The previously seen enhancing mass in the peripheral aspect of the right lower lobe segment 6/7 is no longer seen to enhance consistent with successful ablation without any current evidence of residual tumor. Continue with CT surveillance follow-up in 3 months. 2.  Again seen is a mass in the carlos hepatis which is grossly unchanged from prior study, and may represent metastatic disease, a mass arising from the pancreatic head, though possible is felt to be less likely. 3.  Incidental note is made of a large soft and calcified plaque in the aortic lumen at the level of the renal arteries. COMPARISON: PET scan dating December 4, 2024, CT scan dated dating December 18, 2024 and MRI dating November 20, 2024. DIAGNOSIS: Liver carcinoma, status post microwave ablation. COMMENTS: Liver cell carcinoma / Dr. Nicholson to read TECHNIQUE:  CT scan of the abdomen prior to and after intravenous contrast. CT Dose-Length Product (estimate related to radiation exposure from this exam): 276.41 mGy*cm. FINDINGS: The lung bases are unremarkable. At the site of the previously seen enhancing tumor in the right liver lobe is now a large hypodense area without any enhancement, this is consistent with microwave ablation without any current evidence of any residual enhancing tumor. Again seen is a mass in the carlos hepatis abutting the pancreatic head, this is felt to more likely represent a metastatic lymph node, possibility of pancreatic head

## 2025-02-14 NOTE — PROGRESS NOTES
VASCULAR MEDICINE AND INTERVENTIONAL RADIOLOGY DEPARTMENT:     Chief Complaint   Patient presents with    CTA results     2/17/25 Fahad Mathias returns for results of CTA abdomen s/p microwave ablation of the right liver lobe segment 5/6 liver mass on 12/19/24.  Patient has multifocal liver adenocarcinoma.  Patient is currently receiving chemotherapy with Dr. Bryson.  Patient states he was recently admitted from 2/9/2025 to 2/13/2025 for influenza infection and increased shortness of breath.  He recently tried to return to work today.  States he is experiencing increased shortness of breath with exertion and when he is trying to catch his breath, experiences intermittent chest discomfort.  He endorses constant dull pain to his right upper quadrant, states sometimes he has a quick stabbing pain to the RUQ.  He denies blood in the stool or black tarry stool.  He denies nausea and vomiting at this time, states he had N/V the day he was admitted to the hospital last week.  Thinks his nausea is related to chemo.  He is using O2 currently, states he never had to use it before chemo.  He denies fevers or chills at this time.  He denies constitutional illness symptoms.  He continues to follow with oncology.     HPI on 12/2/24: Fahad Mathias is a 63 y.o. male evaluated via telephone on 12/2/2024,  referred by Dr. Bryson, for liver biopsy and ablation.  Patient was found to have hepatitis C summer 2024, following with infectious disease, has not yet started treatment for this.  Had imaging including MRI revealing multiple liver lesions with features suspicious for multifocal hepatocellular carcinoma or metastatic disease.  Additionally with a solid mass in the carlos hepatis with mass effect upon the portal vein and common bile duct.  Denies chest pain.   Denies dyspnea.   He is a former smoker- 4 months ago. Smoked 1ppd for about 22 years  ETOH: denies  Smokes marijuana.   Denies current or past history of drug use.

## 2025-02-15 LAB
BACTERIA BLD CULT ORG #2: NORMAL
BACTERIA BLD CULT: NORMAL

## 2025-02-17 ENCOUNTER — OFFICE VISIT (OUTPATIENT)
Dept: INTERVENTIONAL RADIOLOGY/VASCULAR | Age: 65
End: 2025-02-17
Payer: COMMERCIAL

## 2025-02-17 VITALS
DIASTOLIC BLOOD PRESSURE: 78 MMHG | HEIGHT: 70 IN | SYSTOLIC BLOOD PRESSURE: 136 MMHG | BODY MASS INDEX: 20.19 KG/M2 | RESPIRATION RATE: 17 BRPM | HEART RATE: 100 BPM | WEIGHT: 141 LBS | OXYGEN SATURATION: 98 %

## 2025-02-17 DIAGNOSIS — C22.0 LIVER CELL CARCINOMA (HCC): ICD-10-CM

## 2025-02-17 DIAGNOSIS — R16.0 LIVER MASS: ICD-10-CM

## 2025-02-17 DIAGNOSIS — C22.0 HEPATOCELLULAR CARCINOMA (HCC): Primary | ICD-10-CM

## 2025-02-17 PROCEDURE — 99213 OFFICE O/P EST LOW 20 MIN: CPT

## 2025-02-17 PROCEDURE — 1123F ACP DISCUSS/DSCN MKR DOCD: CPT

## 2025-02-17 RX ORDER — PROCHLORPERAZINE MALEATE 10 MG
10 TABLET ORAL EVERY 6 HOURS PRN
COMMUNITY
Start: 2025-01-13

## 2025-02-17 RX ORDER — LIDOCAINE AND PRILOCAINE 25; 25 MG/G; MG/G
CREAM TOPICAL
COMMUNITY
Start: 2025-01-13

## 2025-03-08 DIAGNOSIS — C22.9 MALIGNANT NEOPLASM OF LIVER, UNSPECIFIED LIVER MALIGNANCY TYPE (HCC): ICD-10-CM

## 2025-03-08 DIAGNOSIS — B18.2 HEP C W/O COMA, CHRONIC (HCC): ICD-10-CM

## 2025-03-08 LAB
ALBUMIN SERPL-MCNC: 3.4 G/DL (ref 3.5–4.6)
ALP SERPL-CCNC: 128 U/L (ref 35–104)
ALT SERPL-CCNC: 36 U/L (ref 0–41)
ANION GAP SERPL CALCULATED.3IONS-SCNC: 9 MEQ/L (ref 9–15)
AST SERPL-CCNC: 20 U/L (ref 0–40)
BILIRUB SERPL-MCNC: 0.4 MG/DL (ref 0.2–0.7)
BUN SERPL-MCNC: 16 MG/DL (ref 8–23)
CALCIUM SERPL-MCNC: 8.7 MG/DL (ref 8.5–9.9)
CHLORIDE SERPL-SCNC: 102 MEQ/L (ref 95–107)
CO2 SERPL-SCNC: 29 MEQ/L (ref 20–31)
CREAT SERPL-MCNC: 0.67 MG/DL (ref 0.7–1.2)
ERYTHROCYTE [DISTWIDTH] IN BLOOD BY AUTOMATED COUNT: 14.6 % (ref 11.5–14.5)
GLOBULIN SER CALC-MCNC: 3.2 G/DL (ref 2.3–3.5)
GLUCOSE SERPL-MCNC: 106 MG/DL (ref 70–99)
HCT VFR BLD AUTO: 32.2 % (ref 42–52)
HGB BLD-MCNC: 11 G/DL (ref 14–18)
MCH RBC QN AUTO: 31.9 PG (ref 27–31.3)
MCHC RBC AUTO-ENTMCNC: 34.2 % (ref 33–37)
MCV RBC AUTO: 93.3 FL (ref 79–92.2)
PLATELET # BLD AUTO: 48 K/UL (ref 130–400)
POTASSIUM SERPL-SCNC: 4.6 MEQ/L (ref 3.4–4.9)
PROT SERPL-MCNC: 6.6 G/DL (ref 6.3–8)
RBC # BLD AUTO: 3.45 M/UL (ref 4.7–6.1)
SODIUM SERPL-SCNC: 140 MEQ/L (ref 135–144)
WBC # BLD AUTO: 7.7 K/UL (ref 4.8–10.8)

## 2025-03-12 PROBLEM — J10.1 INFLUENZA A: Status: RESOLVED | Noted: 2025-02-10 | Resolved: 2025-03-12

## 2025-03-18 NOTE — PROGRESS NOTES
Side effects, adverse effects of the medication prescribed today, as well as treatment plan/ rationale and result expectations have been discussed with the patient who expresses understanding and desires to proceed.    Close follow up to evaluate treatment results and for coordination of care.  I have reviewed the patient's medical history in detail and updated the computerized patient record.    The patient (or guardian, if applicable) and other individuals in attendance with the patient were advised that Artificial Intelligence will be utilized during this visit to record, process the conversation to generate a clinical note, and support improvement of the AI technology. The patient (or guardian, if applicable) and other individuals in attendance at the appointment consented to the use of AI, including the recording.      NELIDA Jaquez

## 2025-03-19 ENCOUNTER — OFFICE VISIT (OUTPATIENT)
Age: 65
End: 2025-03-19
Payer: COMMERCIAL

## 2025-03-19 VITALS
OXYGEN SATURATION: 96 % | BODY MASS INDEX: 20.9 KG/M2 | HEIGHT: 70 IN | HEART RATE: 92 BPM | SYSTOLIC BLOOD PRESSURE: 136 MMHG | DIASTOLIC BLOOD PRESSURE: 66 MMHG | WEIGHT: 146 LBS

## 2025-03-19 DIAGNOSIS — R22.43 LOCALIZED SWELLING OF BOTH LOWER LEGS: ICD-10-CM

## 2025-03-19 DIAGNOSIS — I10 PRIMARY HYPERTENSION: ICD-10-CM

## 2025-03-19 DIAGNOSIS — I87.2 STASIS DERMATITIS OF BOTH LEGS: Primary | ICD-10-CM

## 2025-03-19 PROCEDURE — 99214 OFFICE O/P EST MOD 30 MIN: CPT | Performed by: PHYSICIAN ASSISTANT

## 2025-03-19 PROCEDURE — 3075F SYST BP GE 130 - 139MM HG: CPT | Performed by: PHYSICIAN ASSISTANT

## 2025-03-19 PROCEDURE — 1123F ACP DISCUSS/DSCN MKR DOCD: CPT | Performed by: PHYSICIAN ASSISTANT

## 2025-03-19 PROCEDURE — 3078F DIAST BP <80 MM HG: CPT | Performed by: PHYSICIAN ASSISTANT

## 2025-03-19 RX ORDER — HYDROCHLOROTHIAZIDE 12.5 MG/1
12.5 CAPSULE ORAL EVERY MORNING
Qty: 90 CAPSULE | Refills: 1 | Status: SHIPPED | OUTPATIENT
Start: 2025-03-19

## 2025-03-19 RX ORDER — TRIAMCINOLONE ACETONIDE 1 MG/G
CREAM TOPICAL
Qty: 80 G | Refills: 0 | Status: SHIPPED | OUTPATIENT
Start: 2025-03-19 | End: 2025-04-02

## 2025-03-19 RX ORDER — HYDROXYZINE HYDROCHLORIDE 25 MG/1
25 TABLET, FILM COATED ORAL 4 TIMES DAILY PRN
Qty: 120 TABLET | Refills: 2 | Status: SHIPPED | OUTPATIENT
Start: 2025-03-19

## 2025-03-19 RX ORDER — AMLODIPINE BESYLATE 5 MG/1
5 TABLET ORAL DAILY
Qty: 90 TABLET | Refills: 0 | Status: CANCELLED | OUTPATIENT
Start: 2025-03-19

## 2025-03-19 ASSESSMENT — ENCOUNTER SYMPTOMS
SORE THROAT: 0
VOMITING: 0
SHORTNESS OF BREATH: 0
COUGH: 0
BACK PAIN: 0
DIARRHEA: 0
NAUSEA: 0
ABDOMINAL PAIN: 0
SINUS PAIN: 0
CHEST TIGHTNESS: 0
SINUS PRESSURE: 0
COLOR CHANGE: 1

## 2025-03-19 ASSESSMENT — ANXIETY QUESTIONNAIRES
2. NOT BEING ABLE TO STOP OR CONTROL WORRYING: NEARLY EVERY DAY
GAD7 TOTAL SCORE: 12
3. WORRYING TOO MUCH ABOUT DIFFERENT THINGS: NEARLY EVERY DAY
7. FEELING AFRAID AS IF SOMETHING AWFUL MIGHT HAPPEN: MORE THAN HALF THE DAYS
4. TROUBLE RELAXING: SEVERAL DAYS
5. BEING SO RESTLESS THAT IT IS HARD TO SIT STILL: NOT AT ALL
1. FEELING NERVOUS, ANXIOUS, OR ON EDGE: NEARLY EVERY DAY
IF YOU CHECKED OFF ANY PROBLEMS ON THIS QUESTIONNAIRE, HOW DIFFICULT HAVE THESE PROBLEMS MADE IT FOR YOU TO DO YOUR WORK, TAKE CARE OF THINGS AT HOME, OR GET ALONG WITH OTHER PEOPLE: SOMEWHAT DIFFICULT
6. BECOMING EASILY ANNOYED OR IRRITABLE: NOT AT ALL

## 2025-03-19 ASSESSMENT — VISUAL ACUITY: OU: 1

## 2025-04-04 ENCOUNTER — TELEPHONE (OUTPATIENT)
Age: 65
End: 2025-04-04

## 2025-04-04 NOTE — TELEPHONE ENCOUNTER
Called patient to schedule a visit with Palliative Care. No answer, left voice message asking for a call back to schedule.

## 2025-04-08 ENCOUNTER — OFFICE VISIT (OUTPATIENT)
Age: 65
End: 2025-04-08
Payer: COMMERCIAL

## 2025-04-08 VITALS
HEIGHT: 70 IN | WEIGHT: 152 LBS | RESPIRATION RATE: 18 BRPM | TEMPERATURE: 98.8 F | HEART RATE: 91 BPM | DIASTOLIC BLOOD PRESSURE: 72 MMHG | BODY MASS INDEX: 21.76 KG/M2 | SYSTOLIC BLOOD PRESSURE: 136 MMHG | OXYGEN SATURATION: 98 %

## 2025-04-08 DIAGNOSIS — B18.2 HEP C W/O COMA, CHRONIC (HCC): Primary | ICD-10-CM

## 2025-04-08 DIAGNOSIS — C22.9 MALIGNANT NEOPLASM OF LIVER, UNSPECIFIED LIVER MALIGNANCY TYPE (HCC): ICD-10-CM

## 2025-04-08 PROCEDURE — 99213 OFFICE O/P EST LOW 20 MIN: CPT | Performed by: INTERNAL MEDICINE

## 2025-04-08 PROCEDURE — 1123F ACP DISCUSS/DSCN MKR DOCD: CPT | Performed by: INTERNAL MEDICINE

## 2025-04-08 ASSESSMENT — ENCOUNTER SYMPTOMS
SHORTNESS OF BREATH: 1
ABDOMINAL PAIN: 1

## 2025-04-08 NOTE — PROGRESS NOTES
91   Resp: 18   Temp: 98.8 °F (37.1 °C)   TempSrc: Core   SpO2: 98%   Weight: 68.9 kg (152 lb)   Height: 1.778 m (5' 10\")     General Appearance: alert and oriented to person, place and time, well-developed and well-nourished, in no acute distress  Skin: warm and dry, no rash.   Head: normocephalic and atraumatic  Eyes: anicteric sclerae  ENT: normal mucous membranes. No oral thrush  No thyromegaly or lymphadenopathy  Lungs: normal respiratory effort, diminished breath sounds bilateral lung fields with mild wheezes  Heart normal S1-S2 no murmur  Abdomen: soft, no tenderness, positive right upper quadrant tenderness  No leg edema  No erythema, no tenderness  Normal speech and behavior  Normal judgment and thought process    Labs were reviewed and discussed with the patient  hep C viral load was undetectable on March 8, 2025  CBC is within normal except for slight hyperglycemia with a glucose of 106, hypoalbuminemia with an albumin level of 3.4, slight elevated alkaline phosphatase of 128  CBC is within normal except for anemia with a hemoglobin of 11 and thrombocytopenia with platelet count of 48k on blood work done on March 8  FINAL DIAGNOSIS:   LIVER BIOPSY-   METASTATIC POORLY DIFFERENTIATED ADENOCARCINOMA, SEE COMMENT.     An electronic signature was used to authenticate this note.    --Madeleine Solorzano MD

## 2025-04-15 ENCOUNTER — OFFICE VISIT (OUTPATIENT)
Dept: PALLATIVE CARE | Age: 65
End: 2025-04-15

## 2025-04-15 VITALS
SYSTOLIC BLOOD PRESSURE: 127 MMHG | WEIGHT: 148.6 LBS | OXYGEN SATURATION: 98 % | TEMPERATURE: 98 F | HEART RATE: 80 BPM | DIASTOLIC BLOOD PRESSURE: 67 MMHG | BODY MASS INDEX: 21.32 KG/M2

## 2025-04-15 DIAGNOSIS — Z51.5 PALLIATIVE CARE ENCOUNTER: ICD-10-CM

## 2025-04-15 DIAGNOSIS — G89.3 CANCER RELATED PAIN: ICD-10-CM

## 2025-04-15 DIAGNOSIS — C22.0 HEPATOCELLULAR CARCINOMA (HCC): Primary | ICD-10-CM

## 2025-04-15 DIAGNOSIS — Z71.89 GOALS OF CARE, COUNSELING/DISCUSSION: ICD-10-CM

## 2025-04-15 RX ORDER — OXYCODONE HYDROCHLORIDE 5 MG/1
5 TABLET ORAL EVERY 6 HOURS PRN
Qty: 120 TABLET | Refills: 0 | Status: SHIPPED | OUTPATIENT
Start: 2025-04-15 | End: 2025-05-15

## 2025-04-15 NOTE — PROGRESS NOTES
Subjective:      Patient Id: [facility] for initial palliative medicine consult for symptom management, advance care planning, and goals of care discussion.  He was accompanied to the appointment by: self.    Chief Complaint   Patient presents with    Referral - General    Other     Abdominal swelling.   Occasional loss of appetite.      Anxiety    Shortness of Breath      HPI       Fahad Mathias is a 64 y.o. male referred to palliative care by  for symptom management, advance care planning, and goals of care conversation. Fahad has complex medical history that includes multifocal liver adenocarcinoma with solid mass and portal hepatis (on chemotherapy every Friday), HTN, hep C, tobacco use, marijuana use, back pain.   History of Present Illness  The patient presents for evaluation of hepatocellular carcinoma.    He continues to maintain his work schedule, adhering to his prescribed blood pressure medication regimen each morning. He utilizes marijuana gummies to manage his appetite and pain, which have been effective. However, he reports an escalation in pain severity, particularly in the region of the liver, describing a sensation of swelling. He has not initiated any other pain management strategies. 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 He reports no adverse reactions to narcotics such as Norco or Percocet and has no history of drug use beyond marijuana. His bowel movements are regular, and he reports no weight loss. He reports no itching of the gums. He reports no nausea. He has designated his brother as his power of  and resides alone with his cat. He reports no recent falls. He is currently undergoing treatment for cancer, which he reports as generally well-tolerated, aside from the pain. He was planning to retire in 12/2024 and wanted to get his health checked out before that. He had never been to a doctor or had surgery before. They started

## 2025-04-23 ASSESSMENT — ENCOUNTER SYMPTOMS
COUGH: 0
BACK PAIN: 1
COLOR CHANGE: 0
SHORTNESS OF BREATH: 0
NAUSEA: 0
VOMITING: 0
VOICE CHANGE: 0

## 2025-05-07 ENCOUNTER — OFFICE VISIT (OUTPATIENT)
Dept: GASTROENTEROLOGY | Age: 65
End: 2025-05-07
Payer: COMMERCIAL

## 2025-05-07 VITALS
HEART RATE: 80 BPM | WEIGHT: 148 LBS | BODY MASS INDEX: 21.24 KG/M2 | OXYGEN SATURATION: 97 % | DIASTOLIC BLOOD PRESSURE: 60 MMHG | SYSTOLIC BLOOD PRESSURE: 100 MMHG

## 2025-05-07 DIAGNOSIS — C22.1 CHOLANGIOCARCINOMA (HCC): Primary | ICD-10-CM

## 2025-05-07 PROCEDURE — 1123F ACP DISCUSS/DSCN MKR DOCD: CPT | Performed by: INTERNAL MEDICINE

## 2025-05-07 PROCEDURE — 99204 OFFICE O/P NEW MOD 45 MIN: CPT | Performed by: INTERNAL MEDICINE

## 2025-05-07 NOTE — PROGRESS NOTES
Subjective:      Patient ID: Fahad Mathias is a 64 y.o. male who presents today for:  Chief Complaint   Patient presents with    New Patient       HPI  This is a very pleasant 64-year-old who came in today for further evaluation and management.  Patient with known history of metastatic cholangiocarcinoma status post biopsy and a right hepatic lobe ablation in December 2024 had afterward imaging that suggested liver nodules and possible carlos hepatis lesion.  Patient was diagnosed with cirrhosis based on FibroScan however nonconclusive.  Noted preserved liver synthetic function.  Patient also found to have hepatitis C for which received treatment and obtain cure.  HCV SVR 12.  Otherwise no increased abdominal girth reported.  No melena or hematochezia.  Patient came in today for further evaluation and management.  And to establish care.  Past Medical History:   Diagnosis Date    Back pain     Hypertension      Past Surgical History:   Procedure Laterality Date    COLONOSCOPY N/A 09/27/2024    COLONOSCOPY DIAGNOSTIC performed by Alejandro Meredith MD at [facility]    CT NEEDLE BIOPSY LIVER PERCUTANEOUS  12/18/2024    CT NEEDLE BIOPSY LIVER PERCUTANEOUS 12[place]2024 [facility] [place]    IR PORT PLACEMENT > 5 YEARS  1/10/2025    IR PORT PLACEMENT > 5 YEARS 1/10/2025 [facility] [place]EDURE    OTHER SURGICAL HISTORY  12/18/2024    CT guided biopsy and liver ablation with montiored anesthesia care - performed by Dr. Nicholson    TUNNELED VENOUS PORT PLACEMENT Right 01/10/2025    Bard ClearVUE Power Port 8F (LOT: [id number] / Exp: 02/28/2026) placed by Dr. Nicholson     Social History     Socioeconomic History    Marital status: Single     Spouse name: Not on file    Number of children: Not on file    Years of education: Not on file    Highest education level: Not on file   Occupational History    Not on file   Tobacco Use    Smoking status: Former    Smokeless tobacco: Never   Vaping Use    Vaping status: Never Used   Substance and

## 2025-05-08 ASSESSMENT — ENCOUNTER SYMPTOMS
VOMITING: 0
BLOOD IN STOOL: 0
WHEEZING: 0
SHORTNESS OF BREATH: 0
ABDOMINAL PAIN: 0
VOICE CHANGE: 0
TROUBLE SWALLOWING: 0
COLOR CHANGE: 0
NAUSEA: 0
PHOTOPHOBIA: 0
EYE PAIN: 0
RECTAL PAIN: 0
CONSTIPATION: 0
ABDOMINAL DISTENTION: 0
CHEST TIGHTNESS: 0
EYE REDNESS: 0
DIARRHEA: 0

## 2025-05-12 ENCOUNTER — TELEPHONE (OUTPATIENT)
Age: 65
End: 2025-05-12

## 2025-05-12 NOTE — TELEPHONE ENCOUNTER
----- Message from PREETHI Blakc CNP sent at 5/7/2025  3:43 PM EDT -----  Please call patient and remind him to schedule his CT Daja ordered and schedule follow up for 5-7 days after. Patient was in Dr. Glynn's office and didn't know when to have his next CT. He can have it May 17th or later. Thank you.

## 2025-05-14 ENCOUNTER — TELEPHONE (OUTPATIENT)
Age: 65
End: 2025-05-14

## 2025-05-14 NOTE — TELEPHONE ENCOUNTER
Called patient; left him a message to call back.     Patient needs a exactly 1wk f/u post CT scan on 5/20/2025.

## 2025-05-14 NOTE — TELEPHONE ENCOUNTER
----- Message from PREETHI Black CNP sent at 5/14/2025  9:44 AM EDT -----  He has CT appointment on 5/20/2025. Please make sure he has appointment the following week. Thank you. ~Selena  ----- Message -----  From: Marilin Rodriguez MA  Sent: 5/12/2025  10:09 AM EDT  To: Ish Jacob MA; #    Left message on patient' voicemail to call office  ----- Message -----  From: Selena Romero APRN - CNP  Sent: 5/7/2025   3:47 PM EDT  To: Ish Jacob MA; Marilin Rodriguez MA    Please call patient and remind him to schedule his CT Daja ordered and schedule follow up for 5-7 days after. Patient was in Dr. Glynn's office and didn't know when to have his next CT. He can have it May 17th or later. Thank you.

## 2025-05-20 ENCOUNTER — HOSPITAL ENCOUNTER (OUTPATIENT)
Dept: CT IMAGING | Age: 65
Discharge: HOME | End: 2025-05-22
Payer: COMMERCIAL

## 2025-05-20 DIAGNOSIS — C22.0 LIVER CELL CARCINOMA (HCC): ICD-10-CM

## 2025-05-20 DIAGNOSIS — C22.0 HEPATOCELLULAR CARCINOMA (HCC): ICD-10-CM

## 2025-05-20 LAB
PERFORMED ON: NORMAL
POC CREATININE: 0.8 MG/DL (ref 0.8–1.3)
POC SAMPLE TYPE: NORMAL

## 2025-05-20 PROCEDURE — 74175 CTA ABDOMEN W/CONTRAST: CPT | Performed by: RADIOLOGY

## 2025-05-20 PROCEDURE — 6360000002 HC RX W HCPCS: Performed by: RADIOLOGY

## 2025-05-20 PROCEDURE — 74175 CTA ABDOMEN W/CONTRAST: CPT

## 2025-05-20 PROCEDURE — 6360000004 HC RX CONTRAST MEDICATION

## 2025-05-20 RX ORDER — HEPARIN 100 UNIT/ML
500 SYRINGE INTRAVENOUS ONCE
Status: COMPLETED | OUTPATIENT
Start: 2025-05-20 | End: 2025-05-20

## 2025-05-20 RX ORDER — IOPAMIDOL 755 MG/ML
75 INJECTION, SOLUTION INTRAVASCULAR
Status: COMPLETED | OUTPATIENT
Start: 2025-05-20 | End: 2025-05-20

## 2025-05-20 RX ADMIN — IOPAMIDOL 75 ML: 755 INJECTION, SOLUTION INTRAVENOUS at 16:24

## 2025-05-20 RX ADMIN — HEPARIN 500 UNITS: 100 SYRINGE at 16:25

## 2025-05-27 ENCOUNTER — OFFICE VISIT (OUTPATIENT)
Age: 65
End: 2025-05-27
Payer: COMMERCIAL

## 2025-05-27 ENCOUNTER — OFFICE VISIT (OUTPATIENT)
Dept: PALLATIVE CARE | Age: 65
End: 2025-05-27
Payer: COMMERCIAL

## 2025-05-27 VITALS
DIASTOLIC BLOOD PRESSURE: 72 MMHG | OXYGEN SATURATION: 98 % | SYSTOLIC BLOOD PRESSURE: 118 MMHG | HEART RATE: 79 BPM | BODY MASS INDEX: 21.05 KG/M2 | HEIGHT: 70 IN | WEIGHT: 147 LBS

## 2025-05-27 VITALS
WEIGHT: 147.8 LBS | SYSTOLIC BLOOD PRESSURE: 118 MMHG | TEMPERATURE: 97.7 F | BODY MASS INDEX: 21.21 KG/M2 | OXYGEN SATURATION: 95 % | HEART RATE: 92 BPM | DIASTOLIC BLOOD PRESSURE: 77 MMHG

## 2025-05-27 DIAGNOSIS — F43.23 SITUATIONAL MIXED ANXIETY AND DEPRESSIVE DISORDER: ICD-10-CM

## 2025-05-27 DIAGNOSIS — B18.2 HEP C W/O COMA, CHRONIC (HCC): ICD-10-CM

## 2025-05-27 DIAGNOSIS — R16.0 LIVER MASS: ICD-10-CM

## 2025-05-27 DIAGNOSIS — Z71.89 GOALS OF CARE, COUNSELING/DISCUSSION: ICD-10-CM

## 2025-05-27 DIAGNOSIS — Z51.5 PALLIATIVE CARE ENCOUNTER: ICD-10-CM

## 2025-05-27 DIAGNOSIS — C22.0 LIVER CELL CARCINOMA (HCC): ICD-10-CM

## 2025-05-27 DIAGNOSIS — C22.0 HEPATOCELLULAR CARCINOMA (HCC): ICD-10-CM

## 2025-05-27 DIAGNOSIS — C22.0 HEPATOCELLULAR CARCINOMA (HCC): Primary | ICD-10-CM

## 2025-05-27 DIAGNOSIS — G89.3 CANCER RELATED PAIN: Primary | ICD-10-CM

## 2025-05-27 PROCEDURE — 1123F ACP DISCUSS/DSCN MKR DOCD: CPT

## 2025-05-27 PROCEDURE — 99214 OFFICE O/P EST MOD 30 MIN: CPT

## 2025-05-27 RX ORDER — OXYCODONE HYDROCHLORIDE 5 MG/1
5 TABLET ORAL EVERY 6 HOURS PRN
Qty: 120 TABLET | Refills: 0 | Status: SHIPPED | OUTPATIENT
Start: 2025-05-27 | End: 2025-06-26

## 2025-05-27 RX ORDER — OXYCODONE HYDROCHLORIDE 5 MG/1
5 TABLET ORAL EVERY 6 HOURS PRN
COMMUNITY
End: 2025-05-27 | Stop reason: SDUPTHER

## 2025-05-27 RX ORDER — SERTRALINE HYDROCHLORIDE 25 MG/1
25 TABLET, FILM COATED ORAL NIGHTLY
Qty: 30 TABLET | Refills: 0 | Status: SHIPPED | OUTPATIENT
Start: 2025-05-27 | End: 2025-06-26

## 2025-05-27 NOTE — PROGRESS NOTES
25mg PO nightly     4. Palliative care encounter  Disease process and goals of treatment were discussed in basic terms. goal is to optimize available comfort care measures current treatment plan. We discussed the palliative care philosophy in light of those goals. We discussed all care options contingent on treatment response and QOL. Much active listening, presence, and emotional support were given     Explained the role of palliative care in treating patient. Discussed symptom management related to chronic disease/condition. Provided emotional support and active listening.      Medications Discontinued During This Encounter   Medication Reason    amLODIPine (NORVASC) 5 MG tablet LIST CLEANUP    hydrocortisone (ANUSOL-HC) 25 MG suppository LIST CLEANUP        -    Due to acuity, symptomatology and high-risk medication management, I advised patient to No follow-ups on file.     Thanks for the opportunity you have allowed us to provide palliative care to Fahad. We will be in touch as care progresses. Please feel free to reach out to us should you have any questions or requests.    Total Time 60 mins (Adv. Care planning 17 mins)   > 50% Time Spent Counseling/Care coordination yes       Lauryn Tate, PREETHI - CNP    Collaborating physician: Dr. Bernal

## 2025-05-27 NOTE — PROGRESS NOTES
Again seen is a large wedge-shaped region of microwave ablation in the lateral  right liver lobe. When compared to prior study, the edges are more clearly  defined now consistent with progressive healing without residual post ablation  inflammation. Just superior and medial to the ablation zone, there is a very  faint ill-defined hypodense region. It is unclear whether this represents faint  volume averaging versus any residual tumor.  The pancreas and adrenals are  unremarkable.The kidneys demonstrate prompt enhancement  and excretion of  contrast without signs of focal mass or hydronephrosis. The visualized bowel  loops appear normal.   There is no signs abdominal fluid collections.  There is  no signs of free air. Again seen is a small mass/lymph node near the carlos  hepatis adjacent to the pancreatic head, this has significantly decreased in  size when compared to prior study, when compared at the same level as previous  scan, it measures 16 x 8 mm and previously measured 23 x 18 mm. Incidental note  is made of a benign hemangioma in the right L4 vertebral body.        Electronically signed by Mello Nicholson        ASSESSMENT AND PLAN:  Chart review as noted of referring HCP last OV.  Medication list reviewed for pre operative examination.Takes ibuprofen.   Above labs reviewed.     Diagnosis Orders   1. Hepatocellular carcinoma (HCC)        2. Liver cell carcinoma (HCC)        3. Liver mass        4. Hep C w/o coma, chronic (HCC)          Plan:     -- Discussed results of most recent CT scan with patient. Discussed again seen is a large wedge-shaped region of microwave ablation in the lateral right liver lobe, edges are more clearly defined now consistent with progressive healing without residual post-ablation inflammation. Also discussed there is very faint ill-defined hypodense region superior and medial to ablation zone, unclear whether represents any residual tumor. Per Dr. Nicholson, will plan on PET scan to

## 2025-06-09 ENCOUNTER — TELEPHONE (OUTPATIENT)
Age: 65
End: 2025-06-09

## 2025-06-09 NOTE — TELEPHONE ENCOUNTER
Received fax from [facility] stating they are unable to reach patient to schedule PET scan.        Left message on patient's voicemail to call office

## 2025-06-11 ENCOUNTER — HOSPITAL ENCOUNTER (OUTPATIENT)
Dept: CT IMAGING | Age: 65
Discharge: HOME | End: 2025-06-13
Payer: COMMERCIAL

## 2025-06-11 DIAGNOSIS — B18.2 HEP C W/O COMA, CHRONIC (HCC): ICD-10-CM

## 2025-06-11 DIAGNOSIS — C22.0 LIVER CELL CARCINOMA (HCC): ICD-10-CM

## 2025-06-11 DIAGNOSIS — C22.0 HEPATOCELLULAR CARCINOMA (HCC): ICD-10-CM

## 2025-06-11 DIAGNOSIS — R16.0 LIVER MASS: ICD-10-CM

## 2025-06-11 PROCEDURE — 3430000000 HC RX DIAGNOSTIC RADIOPHARMACEUTICAL

## 2025-06-11 PROCEDURE — 78815 PET IMAGE W/CT SKULL-THIGH: CPT

## 2025-06-11 PROCEDURE — A9609 HC RX DIAGNOSTIC RADIOPHARMACEUTICAL: HCPCS

## 2025-06-11 RX ORDER — FLUDEOXYGLUCOSE F 18 200 MCI/ML
15.2 INJECTION, SOLUTION INTRAVENOUS
Status: COMPLETED | OUTPATIENT
Start: 2025-06-11 | End: 2025-06-11

## 2025-06-11 RX ADMIN — FLUDEOXYGLUCOSE F 18 15.2 MILLICURIE: 200 INJECTION, SOLUTION INTRAVENOUS at 10:35

## 2025-07-07 DIAGNOSIS — C22.0 HEPATOCELLULAR CARCINOMA (HCC): ICD-10-CM

## 2025-07-07 DIAGNOSIS — G89.3 CANCER RELATED PAIN: ICD-10-CM

## 2025-07-07 RX ORDER — OXYCODONE HYDROCHLORIDE 5 MG/1
5 TABLET ORAL EVERY 6 HOURS PRN
Qty: 120 TABLET | Refills: 0 | Status: SHIPPED | OUTPATIENT
Start: 2025-07-07 | End: 2025-08-06

## 2025-07-07 NOTE — TELEPHONE ENCOUNTER
Requested Prescriptions     Pending Prescriptions Disp Refills    oxyCODONE (ROXICODONE) 5 MG immediate release tablet 120 tablet 0     Sig: Take 1 tablet by mouth every 6 hours as needed for Pain for up to 30 days. Max Daily Amount: 20 mg      Next palliative care appointment is 7/22/25.

## 2025-07-21 ENCOUNTER — OFFICE VISIT (OUTPATIENT)
Age: 65
End: 2025-07-21
Payer: COMMERCIAL

## 2025-07-21 VITALS
HEART RATE: 81 BPM | WEIGHT: 138 LBS | TEMPERATURE: 97.4 F | DIASTOLIC BLOOD PRESSURE: 84 MMHG | OXYGEN SATURATION: 96 % | HEIGHT: 70 IN | BODY MASS INDEX: 19.76 KG/M2 | SYSTOLIC BLOOD PRESSURE: 132 MMHG

## 2025-07-21 DIAGNOSIS — R73.09 ABNORMAL GLUCOSE: Primary | ICD-10-CM

## 2025-07-21 DIAGNOSIS — I10 PRIMARY HYPERTENSION: ICD-10-CM

## 2025-07-21 DIAGNOSIS — H53.9 CHANGE IN VISION: ICD-10-CM

## 2025-07-21 DIAGNOSIS — R53.83 OTHER FATIGUE: ICD-10-CM

## 2025-07-21 DIAGNOSIS — C22.0 LIVER CELL CARCINOMA (HCC): ICD-10-CM

## 2025-07-21 PROCEDURE — 1123F ACP DISCUSS/DSCN MKR DOCD: CPT | Performed by: PHYSICIAN ASSISTANT

## 2025-07-21 PROCEDURE — 3075F SYST BP GE 130 - 139MM HG: CPT | Performed by: PHYSICIAN ASSISTANT

## 2025-07-21 PROCEDURE — 99214 OFFICE O/P EST MOD 30 MIN: CPT | Performed by: PHYSICIAN ASSISTANT

## 2025-07-21 PROCEDURE — 3079F DIAST BP 80-89 MM HG: CPT | Performed by: PHYSICIAN ASSISTANT

## 2025-07-21 ASSESSMENT — ENCOUNTER SYMPTOMS
COUGH: 0
FOREIGN BODY SENSATION: 0
DOUBLE VISION: 0
BLURRED VISION: 1
SORE THROAT: 0
VOMITING: 0
PHOTOPHOBIA: 0
EYE DISCHARGE: 0
SWOLLEN GLANDS: 0
ABDOMINAL PAIN: 0
EYE ITCHING: 0
CHANGE IN BOWEL HABIT: 0
EYE REDNESS: 0
NAUSEA: 0

## 2025-07-21 ASSESSMENT — VISUAL ACUITY: OU: 1

## 2025-07-21 NOTE — PROGRESS NOTES
Subjective  Fahad Mathias 1960 is a 64 y.o. male who presents today with:  Chief Complaint   Patient presents with    Annual Exam     Patient presents today for annual exam. Patient c/o burning sensation in both eyes.       Eye Problem   Both eyes are affected. This is a new problem. The current episode started more than 1 month ago. The problem occurs intermittently. The problem has been unchanged. The injury mechanism is unknown. The pain is mild. There is No known exposure to pink eye. He Does not wear contacts. Associated symptoms include blurred vision. Pertinent negatives include no eye discharge, double vision, eye redness, fever, foreign body sensation, itching, nausea, photophobia, recent URI or vomiting. He has tried nothing for the symptoms.   Fatigue  This is a chronic problem. The problem occurs 2 to 4 times per day. The problem has been unchanged. Associated symptoms include fatigue. Pertinent negatives include no abdominal pain, anorexia, arthralgias, change in bowel habit, chills, congestion, coughing, fever, headaches, joint swelling, myalgias, nausea, sore throat, swollen glands, urinary symptoms or vomiting. The symptoms are aggravated by exertion. He has tried nothing for the symptoms.       Malignant Neoplasm of Liver/Pancytopenia   - Followed by Dr. Bryson - starting chemotherapy: chemotherapy with cisplatin,Gemzar and durvalumab   - Had liver biopsy and ablation back on 12/2/24.  Repeat CT scan showed  large wedge-shaped region of microwave ablation in the lateral right liver lobe, edges are more clearly defined now consistent with progressive healing without residual post-ablation inflammation. However faint ill-defined hypodense region superior and medial to ablation zone. PET scan did not show metabolic activity in the area of question.     Hep C  - patient has genotype 3 [id number], FibroScan of F4.  Epclusa received from December 6, 2024 through February 28, 2025   - expected to do

## 2025-07-22 ENCOUNTER — OFFICE VISIT (OUTPATIENT)
Dept: PALLATIVE CARE | Age: 65
End: 2025-07-22
Payer: COMMERCIAL

## 2025-07-22 VITALS
TEMPERATURE: 97.7 F | BODY MASS INDEX: 19.66 KG/M2 | SYSTOLIC BLOOD PRESSURE: 117 MMHG | HEART RATE: 75 BPM | WEIGHT: 137 LBS | OXYGEN SATURATION: 98 % | DIASTOLIC BLOOD PRESSURE: 70 MMHG

## 2025-07-22 DIAGNOSIS — Z51.5 PALLIATIVE CARE ENCOUNTER: ICD-10-CM

## 2025-07-22 DIAGNOSIS — C22.0 HEPATOCELLULAR CARCINOMA (HCC): ICD-10-CM

## 2025-07-22 DIAGNOSIS — G47.00 INSOMNIA, UNSPECIFIED TYPE: ICD-10-CM

## 2025-07-22 DIAGNOSIS — Z71.89 GOALS OF CARE, COUNSELING/DISCUSSION: ICD-10-CM

## 2025-07-22 DIAGNOSIS — F43.23 SITUATIONAL MIXED ANXIETY AND DEPRESSIVE DISORDER: ICD-10-CM

## 2025-07-22 DIAGNOSIS — R11.2 NAUSEA AND VOMITING, UNSPECIFIED VOMITING TYPE: Primary | ICD-10-CM

## 2025-07-22 DIAGNOSIS — G89.3 CANCER RELATED PAIN: ICD-10-CM

## 2025-07-22 PROCEDURE — 99214 OFFICE O/P EST MOD 30 MIN: CPT

## 2025-07-22 PROCEDURE — 3074F SYST BP LT 130 MM HG: CPT

## 2025-07-22 PROCEDURE — 1123F ACP DISCUSS/DSCN MKR DOCD: CPT

## 2025-07-22 PROCEDURE — 3078F DIAST BP <80 MM HG: CPT

## 2025-07-22 RX ORDER — TRAZODONE HYDROCHLORIDE 50 MG/1
50 TABLET ORAL NIGHTLY
Qty: 30 TABLET | Refills: 0 | Status: SHIPPED | OUTPATIENT
Start: 2025-07-22 | End: 2025-08-21

## 2025-07-22 RX ORDER — ONDANSETRON 4 MG/1
4 TABLET, ORALLY DISINTEGRATING ORAL 3 TIMES DAILY PRN
Qty: 42 TABLET | Refills: 0 | Status: SHIPPED | OUTPATIENT
Start: 2025-07-22 | End: 2025-08-05

## 2025-07-22 RX ORDER — SERTRALINE HYDROCHLORIDE 25 MG/1
25 TABLET, FILM COATED ORAL NIGHTLY
Qty: 30 TABLET | Refills: 0 | Status: SHIPPED | OUTPATIENT
Start: 2025-07-22 | End: 2025-08-21

## 2025-07-22 NOTE — PROGRESS NOTES
Subjective:      Patient Id: [facility] for initial [facility] medicine consult for symptom management, advance care planning, and goals of care discussion.  He was accompanied to the appointment by: self.    Chief Complaint   Patient presents with    Follow-up    Eye Problem     Pain/pressure over both eyes. Occasional blurred vision. Occasional burning.    Insomnia     Can get to sleep, but is up every couple of hours.      Pain  Associated symptoms include myalgias. Pertinent negatives include no arthralgias, chest pain, coughing, fatigue, headaches, nausea, neck pain, rash or vomiting.          Fahad Mathias is a 64 y.o. male referred to palliative care by  for symptom management, advance care planning, and goals of care conversation. Fahad has complex medical history that includes multifocal liver adenocarcinoma with solid mass and portal hepatis (on chemotherapy every Friday), HTN, hep C, tobacco use, marijuana use, back pain.   History of Present Illness      The patient presents for insomnia, nausea, anxiety and depression.    He reports that his cancer-related pain is manageable with oxycodone, which he takes in the morning and at lunchtime. He is currently undergoing treatment and recently had a PET scan. He does not require a refill of his pain medication at this time.    He has been prescribed Zoloft for anxiety and depression, which he takes at bedtime. However, he alternates between Zoloft and another medication for itching, as taking both simultaneously leaves him feeling groggy upon waking. He experiences difficulty staying asleep, often waking up every few hours due to racing thoughts or the need to use the bathroom. He believes that taking his pain medication closer to bedtime might improve his sleep quality.     He also reports occasional nausea and dry heaves, but these symptoms typically resolve on their own. He has Compazine at home for

## 2025-07-29 ENCOUNTER — SCHEDULED TELEPHONE ENCOUNTER (OUTPATIENT)
Age: 65
End: 2025-07-29
Payer: COMMERCIAL

## 2025-07-29 DIAGNOSIS — B18.2 HEP C W/O COMA, CHRONIC (HCC): ICD-10-CM

## 2025-07-29 DIAGNOSIS — R16.0 LIVER MASS: ICD-10-CM

## 2025-07-29 DIAGNOSIS — C22.0 HEPATOCELLULAR CARCINOMA (HCC): Primary | ICD-10-CM

## 2025-07-29 DIAGNOSIS — C22.0 LIVER CELL CARCINOMA (HCC): ICD-10-CM

## 2025-07-29 PROCEDURE — 99212 OFFICE O/P EST SF 10 MIN: CPT

## 2025-08-07 ENCOUNTER — TELEPHONE (OUTPATIENT)
Age: 65
End: 2025-08-07

## 2025-08-12 DIAGNOSIS — G89.3 CANCER RELATED PAIN: Primary | ICD-10-CM

## 2025-08-12 DIAGNOSIS — C22.0 HEPATOCELLULAR CARCINOMA (HCC): ICD-10-CM

## 2025-08-12 RX ORDER — OXYCODONE HYDROCHLORIDE 5 MG/1
5 TABLET ORAL EVERY 6 HOURS PRN
Qty: 120 TABLET | Refills: 0 | Status: SHIPPED | OUTPATIENT
Start: 2025-08-12 | End: 2025-09-11

## 2025-08-12 RX ORDER — OXYCODONE HYDROCHLORIDE 5 MG/1
5 TABLET ORAL EVERY 6 HOURS PRN
COMMUNITY
End: 2025-08-12 | Stop reason: SDUPTHER

## (undated) DEVICE — TUBING IRRIGATION 140/160/180/190 SER GI ENDOSCP SMARTCAP

## (undated) DEVICE — TUBE SET 96 MM 64 MM H2O PERISTALTIC STD AUX CHANNEL

## (undated) DEVICE — SNARE SURG 9 MM 2.4 MMX230 CM EXACTO CLD

## (undated) DEVICE — BRUSH ENDO CLN L90.5IN SHTH DIA1.7MM BRIST DIA5-7MM 2-6MM

## (undated) DEVICE — SINGLE PORT MANIFOLD: Brand: NEPTUNE 2

## (undated) DEVICE — TUBING, SUCTION, 1/4" X 10', STRAIGHT: Brand: MEDLINE

## (undated) DEVICE — TRAP POLYP BALEEN